# Patient Record
Sex: FEMALE | Race: WHITE | NOT HISPANIC OR LATINO | URBAN - METROPOLITAN AREA
[De-identification: names, ages, dates, MRNs, and addresses within clinical notes are randomized per-mention and may not be internally consistent; named-entity substitution may affect disease eponyms.]

---

## 2017-04-13 ENCOUNTER — EMERGENCY (EMERGENCY)
Facility: HOSPITAL | Age: 67
LOS: 1 days | Discharge: HOME | End: 2017-04-13
Attending: EMERGENCY MEDICINE | Admitting: EMERGENCY MEDICINE
Payer: COMMERCIAL

## 2017-04-13 VITALS
OXYGEN SATURATION: 94 % | TEMPERATURE: 98 F | SYSTOLIC BLOOD PRESSURE: 143 MMHG | RESPIRATION RATE: 20 BRPM | HEART RATE: 63 BPM | DIASTOLIC BLOOD PRESSURE: 91 MMHG

## 2017-04-13 VITALS
DIASTOLIC BLOOD PRESSURE: 60 MMHG | RESPIRATION RATE: 20 BRPM | OXYGEN SATURATION: 97 % | SYSTOLIC BLOOD PRESSURE: 119 MMHG | HEART RATE: 65 BPM

## 2017-04-13 DIAGNOSIS — I10 ESSENTIAL (PRIMARY) HYPERTENSION: ICD-10-CM

## 2017-04-13 DIAGNOSIS — Z96.653 PRESENCE OF ARTIFICIAL KNEE JOINT, BILATERAL: ICD-10-CM

## 2017-04-13 DIAGNOSIS — R13.0 APHAGIA: ICD-10-CM

## 2017-04-13 DIAGNOSIS — E78.5 HYPERLIPIDEMIA, UNSPECIFIED: ICD-10-CM

## 2017-04-13 DIAGNOSIS — R05 COUGH: ICD-10-CM

## 2017-04-13 DIAGNOSIS — J06.9 ACUTE UPPER RESPIRATORY INFECTION, UNSPECIFIED: ICD-10-CM

## 2017-04-13 DIAGNOSIS — Z96.659 PRESENCE OF UNSPECIFIED ARTIFICIAL KNEE JOINT: Chronic | ICD-10-CM

## 2017-04-13 DIAGNOSIS — Z79.82 LONG TERM (CURRENT) USE OF ASPIRIN: ICD-10-CM

## 2017-04-13 DIAGNOSIS — Z79.899 OTHER LONG TERM (CURRENT) DRUG THERAPY: ICD-10-CM

## 2017-04-13 DIAGNOSIS — Z88.5 ALLERGY STATUS TO NARCOTIC AGENT: ICD-10-CM

## 2017-04-13 LAB
ALBUMIN SERPL ELPH-MCNC: 4.3 G/DL — SIGNIFICANT CHANGE UP (ref 3.3–5)
ALP SERPL-CCNC: 110 U/L — SIGNIFICANT CHANGE UP (ref 40–120)
ALT FLD-CCNC: 33 U/L RC — SIGNIFICANT CHANGE UP (ref 10–45)
ANION GAP SERPL CALC-SCNC: 15 MMOL/L — SIGNIFICANT CHANGE UP (ref 5–17)
AST SERPL-CCNC: 27 U/L — SIGNIFICANT CHANGE UP (ref 10–40)
BASOPHILS # BLD AUTO: 0.1 K/UL — SIGNIFICANT CHANGE UP (ref 0–0.2)
BASOPHILS NFR BLD AUTO: 0.7 % — SIGNIFICANT CHANGE UP (ref 0–2)
BILIRUB SERPL-MCNC: 0.2 MG/DL — SIGNIFICANT CHANGE UP (ref 0.2–1.2)
BUN SERPL-MCNC: 18 MG/DL — SIGNIFICANT CHANGE UP (ref 7–23)
CALCIUM SERPL-MCNC: 9.7 MG/DL — SIGNIFICANT CHANGE UP (ref 8.4–10.5)
CHLORIDE SERPL-SCNC: 102 MMOL/L — SIGNIFICANT CHANGE UP (ref 96–108)
CO2 SERPL-SCNC: 27 MMOL/L — SIGNIFICANT CHANGE UP (ref 22–31)
CREAT SERPL-MCNC: 0.81 MG/DL — SIGNIFICANT CHANGE UP (ref 0.5–1.3)
EOSINOPHIL # BLD AUTO: 0.3 K/UL — SIGNIFICANT CHANGE UP (ref 0–0.5)
EOSINOPHIL NFR BLD AUTO: 3.3 % — SIGNIFICANT CHANGE UP (ref 0–6)
GAS PNL BLDV: SIGNIFICANT CHANGE UP
GLUCOSE SERPL-MCNC: 100 MG/DL — HIGH (ref 70–99)
HCT VFR BLD CALC: 41.6 % — SIGNIFICANT CHANGE UP (ref 34.5–45)
HGB BLD-MCNC: 13.7 G/DL — SIGNIFICANT CHANGE UP (ref 11.5–15.5)
LYMPHOCYTES # BLD AUTO: 1.7 K/UL — SIGNIFICANT CHANGE UP (ref 1–3.3)
LYMPHOCYTES # BLD AUTO: 22 % — SIGNIFICANT CHANGE UP (ref 13–44)
MCHC RBC-ENTMCNC: 28.5 PG — SIGNIFICANT CHANGE UP (ref 27–34)
MCHC RBC-ENTMCNC: 32.8 GM/DL — SIGNIFICANT CHANGE UP (ref 32–36)
MCV RBC AUTO: 86.6 FL — SIGNIFICANT CHANGE UP (ref 80–100)
MONOCYTES # BLD AUTO: 0.6 K/UL — SIGNIFICANT CHANGE UP (ref 0–0.9)
MONOCYTES NFR BLD AUTO: 8.3 % — SIGNIFICANT CHANGE UP (ref 2–14)
NEUTROPHILS # BLD AUTO: 5 K/UL — SIGNIFICANT CHANGE UP (ref 1.8–7.4)
NEUTROPHILS NFR BLD AUTO: 65.7 % — SIGNIFICANT CHANGE UP (ref 43–77)
PLATELET # BLD AUTO: 215 K/UL — SIGNIFICANT CHANGE UP (ref 150–400)
POTASSIUM SERPL-MCNC: 4.3 MMOL/L — SIGNIFICANT CHANGE UP (ref 3.5–5.3)
POTASSIUM SERPL-SCNC: 4.3 MMOL/L — SIGNIFICANT CHANGE UP (ref 3.5–5.3)
PROT SERPL-MCNC: 7.2 G/DL — SIGNIFICANT CHANGE UP (ref 6–8.3)
RAPID RVP RESULT: DETECTED
RBC # BLD: 4.81 M/UL — SIGNIFICANT CHANGE UP (ref 3.8–5.2)
RBC # FLD: 12.8 % — SIGNIFICANT CHANGE UP (ref 10.3–14.5)
RV+EV RNA SPEC QL NAA+PROBE: DETECTED
SODIUM SERPL-SCNC: 144 MMOL/L — SIGNIFICANT CHANGE UP (ref 135–145)
WBC # BLD: 7.6 K/UL — SIGNIFICANT CHANGE UP (ref 3.8–10.5)
WBC # FLD AUTO: 7.6 K/UL — SIGNIFICANT CHANGE UP (ref 3.8–10.5)

## 2017-04-13 PROCEDURE — 71046 X-RAY EXAM CHEST 2 VIEWS: CPT

## 2017-04-13 PROCEDURE — 83605 ASSAY OF LACTIC ACID: CPT

## 2017-04-13 PROCEDURE — 85014 HEMATOCRIT: CPT

## 2017-04-13 PROCEDURE — 99284 EMERGENCY DEPT VISIT MOD MDM: CPT

## 2017-04-13 PROCEDURE — 99283 EMERGENCY DEPT VISIT LOW MDM: CPT | Mod: 25

## 2017-04-13 PROCEDURE — 84132 ASSAY OF SERUM POTASSIUM: CPT

## 2017-04-13 PROCEDURE — 87633 RESP VIRUS 12-25 TARGETS: CPT

## 2017-04-13 PROCEDURE — 87581 M.PNEUMON DNA AMP PROBE: CPT

## 2017-04-13 PROCEDURE — 71020: CPT | Mod: 26

## 2017-04-13 PROCEDURE — 82947 ASSAY GLUCOSE BLOOD QUANT: CPT

## 2017-04-13 PROCEDURE — 87486 CHLMYD PNEUM DNA AMP PROBE: CPT

## 2017-04-13 PROCEDURE — 82435 ASSAY OF BLOOD CHLORIDE: CPT

## 2017-04-13 PROCEDURE — 94640 AIRWAY INHALATION TREATMENT: CPT

## 2017-04-13 PROCEDURE — 85027 COMPLETE CBC AUTOMATED: CPT

## 2017-04-13 PROCEDURE — 80053 COMPREHEN METABOLIC PANEL: CPT

## 2017-04-13 PROCEDURE — 82803 BLOOD GASES ANY COMBINATION: CPT

## 2017-04-13 PROCEDURE — 82330 ASSAY OF CALCIUM: CPT

## 2017-04-13 PROCEDURE — 84295 ASSAY OF SERUM SODIUM: CPT

## 2017-04-13 PROCEDURE — 87798 DETECT AGENT NOS DNA AMP: CPT

## 2017-04-13 RX ORDER — ALBUTEROL 90 UG/1
2 AEROSOL, METERED ORAL ONCE
Qty: 0 | Refills: 0 | Status: COMPLETED | OUTPATIENT
Start: 2017-04-13 | End: 2017-04-13

## 2017-04-13 RX ORDER — IPRATROPIUM/ALBUTEROL SULFATE 18-103MCG
3 AEROSOL WITH ADAPTER (GRAM) INHALATION ONCE
Qty: 0 | Refills: 0 | Status: COMPLETED | OUTPATIENT
Start: 2017-04-13 | End: 2017-04-13

## 2017-04-13 RX ADMIN — Medication 3 MILLILITER(S): at 15:26

## 2017-04-13 RX ADMIN — Medication 40 MILLIGRAM(S): at 18:12

## 2017-04-13 RX ADMIN — ALBUTEROL 2 PUFF(S): 90 AEROSOL, METERED ORAL at 18:13

## 2017-04-13 NOTE — ED ADULT NURSE NOTE - OBJECTIVE STATEMENT
Pt presents for eval of cough productive of green sputum, accompanied by green nasal discharge, wheezing and fever to 102 earlier thus week.  She was placed on a Z-lashay and given Phenergan syrup without improvement.   Initially on exam, n o wheezing heard, then did return.  Oral mucosa slightly dry, not cracked.

## 2017-04-13 NOTE — ED ADULT NURSE NOTE - PMH
Arthritis  hx bilateral knee replacements  HLD (hyperlipidemia)    HTN (hypertension)    Morbid obesity    Shingles outbreak  (2015)  UTI (urinary tract infection)  Nov 2013

## 2017-04-13 NOTE — ED PROVIDER NOTE - PLAN OF CARE
Taske prednisone 20 mg 2 tabs twice daily for 5 days.  Use the albuterol inhaler as needed.  Follow-up with your PCP in one week or if your symptoms worsen.

## 2017-04-13 NOTE — ED PROVIDER NOTE - OBJECTIVE STATEMENT
68 y/o woman with HTN, HLD presents complaining of cough.  5 days ago, patient had sore throat and difficulty swallowing.  The next day, she developed a cough and fever to 102.  3 days ago, patient presented to urgent care.  She was prescribed azithromycin, Promethazine DM syrup.  No CXR was performed.  Patient presents today because although fevers have resolved, chills and cough persist.  Cough is productive of yellow-green sputum.  Patient endorses many sick contacts at work, received flu shot September 2016. 68 y/o woman with HTN, HLD presents complaining of cough.  5 days ago, patient had sore throat and difficulty swallowing.  The next day, she developed a cough and fever to 102.  3 days ago, patient presented to urgent care.  She was prescribed azithromycin, Promethazine DM syrup.  No CXR was performed.  Patient presents today because although fevers have resolved, chills and cough persist.  Cough is productive of yellow-green sputum.  Patient endorses many sick contacts at work, received flu shot 2016.    Attendinyo female presents with cough and congestion for a few days.  Feels like she has a wheeze in her chest.  No fever/chills.

## 2017-04-13 NOTE — ED PROVIDER NOTE - PROGRESS NOTE DETAILS
Patient reports improved symptoms s/p nebulizer.  CXR neg, RVP pos for enterovirus.  Patient stable for discharge home

## 2017-04-13 NOTE — ED PROVIDER NOTE - CARE PLAN
Principal Discharge DX:	Upper respiratory infection  Instructions for follow-up, activity and diet:	Taske prednisone 20 mg 2 tabs twice daily for 5 days.  Use the albuterol inhaler as needed.  Follow-up with your PCP in one week or if your symptoms worsen.

## 2017-05-08 ENCOUNTER — OUTPATIENT (OUTPATIENT)
Dept: OUTPATIENT SERVICES | Facility: HOSPITAL | Age: 67
LOS: 1 days | End: 2017-05-08
Payer: COMMERCIAL

## 2017-05-08 ENCOUNTER — APPOINTMENT (OUTPATIENT)
Dept: CT IMAGING | Facility: IMAGING CENTER | Age: 67
End: 2017-05-08

## 2017-05-08 DIAGNOSIS — Z96.659 PRESENCE OF UNSPECIFIED ARTIFICIAL KNEE JOINT: Chronic | ICD-10-CM

## 2017-05-08 DIAGNOSIS — Z00.8 ENCOUNTER FOR OTHER GENERAL EXAMINATION: ICD-10-CM

## 2017-05-08 PROCEDURE — 71250 CT THORAX DX C-: CPT

## 2017-06-07 ENCOUNTER — EMERGENCY (EMERGENCY)
Facility: HOSPITAL | Age: 67
LOS: 1 days | Discharge: HOME | End: 2017-06-07
Attending: EMERGENCY MEDICINE | Admitting: EMERGENCY MEDICINE
Payer: COMMERCIAL

## 2017-06-07 VITALS
TEMPERATURE: 98 F | SYSTOLIC BLOOD PRESSURE: 172 MMHG | RESPIRATION RATE: 16 BRPM | HEART RATE: 61 BPM | DIASTOLIC BLOOD PRESSURE: 94 MMHG | OXYGEN SATURATION: 96 %

## 2017-06-07 VITALS
SYSTOLIC BLOOD PRESSURE: 146 MMHG | RESPIRATION RATE: 16 BRPM | DIASTOLIC BLOOD PRESSURE: 91 MMHG | OXYGEN SATURATION: 97 % | HEART RATE: 62 BPM

## 2017-06-07 DIAGNOSIS — Z79.899 OTHER LONG TERM (CURRENT) DRUG THERAPY: ICD-10-CM

## 2017-06-07 DIAGNOSIS — R35.0 FREQUENCY OF MICTURITION: ICD-10-CM

## 2017-06-07 DIAGNOSIS — Z96.659 PRESENCE OF UNSPECIFIED ARTIFICIAL KNEE JOINT: Chronic | ICD-10-CM

## 2017-06-07 DIAGNOSIS — R30.0 DYSURIA: ICD-10-CM

## 2017-06-07 DIAGNOSIS — I10 ESSENTIAL (PRIMARY) HYPERTENSION: ICD-10-CM

## 2017-06-07 DIAGNOSIS — R10.32 LEFT LOWER QUADRANT PAIN: ICD-10-CM

## 2017-06-07 DIAGNOSIS — K57.32 DIVERTICULITIS OF LARGE INTESTINE WITHOUT PERFORATION OR ABSCESS WITHOUT BLEEDING: ICD-10-CM

## 2017-06-07 LAB
ALBUMIN SERPL ELPH-MCNC: 3.9 G/DL — SIGNIFICANT CHANGE UP (ref 3.3–5)
ALP SERPL-CCNC: 100 U/L — SIGNIFICANT CHANGE UP (ref 40–120)
ALT FLD-CCNC: 27 U/L RC — SIGNIFICANT CHANGE UP (ref 10–45)
ANION GAP SERPL CALC-SCNC: 13 MMOL/L — SIGNIFICANT CHANGE UP (ref 5–17)
APPEARANCE UR: CLEAR — SIGNIFICANT CHANGE UP
AST SERPL-CCNC: 26 U/L — SIGNIFICANT CHANGE UP (ref 10–40)
BASOPHILS # BLD AUTO: 0 K/UL — SIGNIFICANT CHANGE UP (ref 0–0.2)
BASOPHILS NFR BLD AUTO: 0.6 % — SIGNIFICANT CHANGE UP (ref 0–2)
BILIRUB SERPL-MCNC: 0.3 MG/DL — SIGNIFICANT CHANGE UP (ref 0.2–1.2)
BILIRUB UR-MCNC: NEGATIVE — SIGNIFICANT CHANGE UP
BUN SERPL-MCNC: 20 MG/DL — SIGNIFICANT CHANGE UP (ref 7–23)
CALCIUM SERPL-MCNC: 9.8 MG/DL — SIGNIFICANT CHANGE UP (ref 8.4–10.5)
CHLORIDE SERPL-SCNC: 104 MMOL/L — SIGNIFICANT CHANGE UP (ref 96–108)
CO2 SERPL-SCNC: 26 MMOL/L — SIGNIFICANT CHANGE UP (ref 22–31)
COLOR SPEC: YELLOW — SIGNIFICANT CHANGE UP
CREAT SERPL-MCNC: 0.61 MG/DL — SIGNIFICANT CHANGE UP (ref 0.5–1.3)
DIFF PNL FLD: NEGATIVE — SIGNIFICANT CHANGE UP
EOSINOPHIL # BLD AUTO: 0.3 K/UL — SIGNIFICANT CHANGE UP (ref 0–0.5)
EOSINOPHIL NFR BLD AUTO: 4.9 % — SIGNIFICANT CHANGE UP (ref 0–6)
EPI CELLS # UR: SIGNIFICANT CHANGE UP /HPF
GLUCOSE SERPL-MCNC: 103 MG/DL — HIGH (ref 70–99)
GLUCOSE UR QL: NEGATIVE — SIGNIFICANT CHANGE UP
HCT VFR BLD CALC: 44.6 % — SIGNIFICANT CHANGE UP (ref 34.5–45)
HGB BLD-MCNC: 13.5 G/DL — SIGNIFICANT CHANGE UP (ref 11.5–15.5)
KETONES UR-MCNC: NEGATIVE — SIGNIFICANT CHANGE UP
LEUKOCYTE ESTERASE UR-ACNC: ABNORMAL
LYMPHOCYTES # BLD AUTO: 1.6 K/UL — SIGNIFICANT CHANGE UP (ref 1–3.3)
LYMPHOCYTES # BLD AUTO: 22.5 % — SIGNIFICANT CHANGE UP (ref 13–44)
MCHC RBC-ENTMCNC: 26.9 PG — LOW (ref 27–34)
MCHC RBC-ENTMCNC: 30.4 GM/DL — LOW (ref 32–36)
MCV RBC AUTO: 88.4 FL — SIGNIFICANT CHANGE UP (ref 80–100)
MONOCYTES # BLD AUTO: 0.6 K/UL — SIGNIFICANT CHANGE UP (ref 0–0.9)
MONOCYTES NFR BLD AUTO: 8.9 % — SIGNIFICANT CHANGE UP (ref 2–14)
NEUTROPHILS # BLD AUTO: 4.4 K/UL — SIGNIFICANT CHANGE UP (ref 1.8–7.4)
NEUTROPHILS NFR BLD AUTO: 63 % — SIGNIFICANT CHANGE UP (ref 43–77)
NITRITE UR-MCNC: POSITIVE
PH UR: 5.5 — SIGNIFICANT CHANGE UP (ref 5–8)
PLATELET # BLD AUTO: 220 K/UL — SIGNIFICANT CHANGE UP (ref 150–400)
POTASSIUM SERPL-MCNC: 4.5 MMOL/L — SIGNIFICANT CHANGE UP (ref 3.5–5.3)
POTASSIUM SERPL-SCNC: 4.5 MMOL/L — SIGNIFICANT CHANGE UP (ref 3.5–5.3)
PROT SERPL-MCNC: 7.1 G/DL — SIGNIFICANT CHANGE UP (ref 6–8.3)
PROT UR-MCNC: NEGATIVE — SIGNIFICANT CHANGE UP
RBC # BLD: 5.04 M/UL — SIGNIFICANT CHANGE UP (ref 3.8–5.2)
RBC # FLD: 12.4 % — SIGNIFICANT CHANGE UP (ref 10.3–14.5)
RBC CASTS # UR COMP ASSIST: SIGNIFICANT CHANGE UP /HPF (ref 0–2)
SODIUM SERPL-SCNC: 143 MMOL/L — SIGNIFICANT CHANGE UP (ref 135–145)
SP GR SPEC: 1.02 — SIGNIFICANT CHANGE UP (ref 1.01–1.02)
UROBILINOGEN FLD QL: NEGATIVE — SIGNIFICANT CHANGE UP
WBC # BLD: 7 K/UL — SIGNIFICANT CHANGE UP (ref 3.8–10.5)
WBC # FLD AUTO: 7 K/UL — SIGNIFICANT CHANGE UP (ref 3.8–10.5)
WBC UR QL: SIGNIFICANT CHANGE UP /HPF (ref 0–5)

## 2017-06-07 PROCEDURE — 87086 URINE CULTURE/COLONY COUNT: CPT

## 2017-06-07 PROCEDURE — 99284 EMERGENCY DEPT VISIT MOD MDM: CPT | Mod: 25

## 2017-06-07 PROCEDURE — 80053 COMPREHEN METABOLIC PANEL: CPT

## 2017-06-07 PROCEDURE — 81001 URINALYSIS AUTO W/SCOPE: CPT

## 2017-06-07 PROCEDURE — 99283 EMERGENCY DEPT VISIT LOW MDM: CPT

## 2017-06-07 PROCEDURE — 85027 COMPLETE CBC AUTOMATED: CPT

## 2017-06-07 PROCEDURE — 87186 SC STD MICRODIL/AGAR DIL: CPT

## 2017-06-07 RX ORDER — METRONIDAZOLE 500 MG
500 TABLET ORAL ONCE
Qty: 0 | Refills: 0 | Status: COMPLETED | OUTPATIENT
Start: 2017-06-07 | End: 2017-06-07

## 2017-06-07 RX ORDER — CIPROFLOXACIN LACTATE 400MG/40ML
1 VIAL (ML) INTRAVENOUS
Qty: 20 | Refills: 0 | OUTPATIENT
Start: 2017-06-07 | End: 2017-06-17

## 2017-06-07 RX ORDER — METRONIDAZOLE 500 MG
1 TABLET ORAL
Qty: 42 | Refills: 0 | OUTPATIENT
Start: 2017-06-07 | End: 2017-06-21

## 2017-06-07 RX ORDER — CEPHALEXIN 500 MG
1 CAPSULE ORAL
Qty: 21 | Refills: 0 | OUTPATIENT
Start: 2017-06-07 | End: 2017-06-14

## 2017-06-07 RX ORDER — CEFTRIAXONE 500 MG/1
1 INJECTION, POWDER, FOR SOLUTION INTRAMUSCULAR; INTRAVENOUS ONCE
Qty: 0 | Refills: 0 | Status: DISCONTINUED | OUTPATIENT
Start: 2017-06-07 | End: 2017-06-07

## 2017-06-07 RX ORDER — CIPROFLOXACIN LACTATE 400MG/40ML
500 VIAL (ML) INTRAVENOUS ONCE
Qty: 0 | Refills: 0 | Status: COMPLETED | OUTPATIENT
Start: 2017-06-07 | End: 2017-06-07

## 2017-06-07 RX ORDER — PHENAZOPYRIDINE HCL 100 MG
1 TABLET ORAL
Qty: 6 | Refills: 0 | OUTPATIENT
Start: 2017-06-07 | End: 2017-06-09

## 2017-06-07 RX ADMIN — Medication 500 MILLIGRAM(S): at 13:29

## 2017-06-07 NOTE — ED PROVIDER NOTE - OBJECTIVE STATEMENT
67 y.o. female history of HTN and hyperlipidemia, coming in with episodic lower abdominal pain over the past 3 days.  + dysuria and frequency.  No discharge from vagina.  No back pain or fever.  No nausea, vomiting, or diarrhea.  + pruritis all over body, no skin changes.  Pain comes and goes on its own but can be reproduced by palpation. 67 y.o. female history of HTN and hyperlipidemia, coming in with episodic lower abdominal pain over the past 3 days.  + dysuria and frequency.  No discharge from vagina.  No back pain or fever.  No nausea, vomiting, or diarrhea.  + pruritis all over body, no skin changes.  Pain comes and goes on its own but can be reproduced by palpation.    Shipman:  LLQ pain for 4 days.  No fever no N/V.

## 2017-06-07 NOTE — ED PROVIDER NOTE - ATTENDING CONTRIBUTION TO CARE
Umberto:  I have independently evaluated the patient and have documented in the appropriate sections above.  I agree with the exam and plan as noted above.

## 2017-06-07 NOTE — ED PROVIDER NOTE - PROGRESS NOTE DETAILS
Pt has a nitrite positive UTI, will cancel CT as suspicion for diverticulitis is no longer as high  in the context of the UA findings.  BS Umberto:  Seen by me.  will treat for presumptive diverticulitis

## 2017-06-07 NOTE — ED PROVIDER NOTE - PHYSICAL EXAMINATION
Shipman:  General: No distress.  Mentation at baseline.   HEENT: WNL  Chest/Lungs: CTAB, No wheeze, No retractions, No increased work of breathing, Normal rate  Heart: S1S2 RRR, No M/R/G, Pules equal Bilaterally in upper and lower extremities distally  Abd: soft, tender to left side, No guarding, No rebound.  No hernias, no palpable masses.  Extrem: FROM in all joints, no significant edema noted, No ulcers.  Cap refil < 2sec.  Skin: No rash noted, warm dry.  Neuro:  Grossly normal.  No difficulty ambulating. No focal deficits.  Psychiatric: No evidence of delusions. No SI/HI.

## 2017-06-07 NOTE — ED ADULT NURSE NOTE - OBJECTIVE STATEMENT
pt states 3 days hx of abd pain bloating presents to er pt denies fevers or chills no vomiting pt staes no appetite increased urination verbalized last colonoscopy 10 years ago normal bowel movement today

## 2017-06-07 NOTE — ED PROVIDER NOTE - PRINCIPAL DIAGNOSIS
Acute cystitis without hematuria Diverticulitis of large intestine without perforation or abscess without bleeding

## 2017-06-07 NOTE — ED PROVIDER NOTE - CARE PLAN
Principal Discharge DX:	Acute cystitis without hematuria Principal Discharge DX:	Diverticulitis of large intestine without perforation or abscess without bleeding

## 2017-06-11 RX ORDER — NITROFURANTOIN MACROCRYSTAL 50 MG
1 CAPSULE ORAL
Qty: 14 | Refills: 0 | OUTPATIENT
Start: 2017-06-11 | End: 2017-06-18

## 2017-06-11 NOTE — ED POST DISCHARGE NOTE - DETAILS
6/11/17, 3:45pm: Left voicemail for pt to call back. Pt should be started on Macrobid based on UC sensitivities. - Kenton Dumont PA-C

## 2017-06-11 NOTE — ED POST DISCHARGE NOTE - OTHER COMMUNICATION
6/11/17, 4:30pm: Pt called back and was informed about need for Macrobid based on UC sensitivity. Rx sent to pharmacy, and pt was instructed to return to ER if she experiences worsening symptoms or signs of infection such as high fever. Pt reports follow up with GI who has scheduled colonoscopy to investigate diverticulitis dx. - Kenton Dumont PA-C

## 2017-07-19 ENCOUNTER — RESULT REVIEW (OUTPATIENT)
Age: 67
End: 2017-07-19

## 2017-08-22 ENCOUNTER — APPOINTMENT (OUTPATIENT)
Dept: MAMMOGRAPHY | Facility: IMAGING CENTER | Age: 67
End: 2017-08-22
Payer: COMMERCIAL

## 2017-08-22 ENCOUNTER — OUTPATIENT (OUTPATIENT)
Dept: OUTPATIENT SERVICES | Facility: HOSPITAL | Age: 67
LOS: 1 days | End: 2017-08-22
Payer: COMMERCIAL

## 2017-08-22 DIAGNOSIS — Z00.8 ENCOUNTER FOR OTHER GENERAL EXAMINATION: ICD-10-CM

## 2017-08-22 DIAGNOSIS — Z96.659 PRESENCE OF UNSPECIFIED ARTIFICIAL KNEE JOINT: Chronic | ICD-10-CM

## 2017-08-22 PROCEDURE — 77063 BREAST TOMOSYNTHESIS BI: CPT

## 2017-08-22 PROCEDURE — 77067 SCR MAMMO BI INCL CAD: CPT

## 2017-08-22 PROCEDURE — 77063 BREAST TOMOSYNTHESIS BI: CPT | Mod: 26

## 2017-08-22 PROCEDURE — G0202: CPT | Mod: 26

## 2017-12-18 ENCOUNTER — APPOINTMENT (OUTPATIENT)
Dept: ORTHOPEDIC SURGERY | Facility: CLINIC | Age: 67
End: 2017-12-18

## 2018-06-24 ENCOUNTER — EMERGENCY (EMERGENCY)
Facility: HOSPITAL | Age: 68
LOS: 1 days | Discharge: HOME | End: 2018-06-24
Attending: EMERGENCY MEDICINE | Admitting: EMERGENCY MEDICINE
Payer: COMMERCIAL

## 2018-06-24 VITALS
OXYGEN SATURATION: 100 % | DIASTOLIC BLOOD PRESSURE: 87 MMHG | TEMPERATURE: 99 F | HEART RATE: 65 BPM | RESPIRATION RATE: 98 BRPM | SYSTOLIC BLOOD PRESSURE: 170 MMHG

## 2018-06-24 DIAGNOSIS — Z96.659 PRESENCE OF UNSPECIFIED ARTIFICIAL KNEE JOINT: Chronic | ICD-10-CM

## 2018-06-24 LAB
APPEARANCE UR: CLEAR — SIGNIFICANT CHANGE UP
BACTERIA # UR AUTO: SIGNIFICANT CHANGE UP
BILIRUB UR-MCNC: NEGATIVE — SIGNIFICANT CHANGE UP
BLOOD UR QL VISUAL: NEGATIVE — SIGNIFICANT CHANGE UP
COLOR SPEC: YELLOW — SIGNIFICANT CHANGE UP
GLUCOSE UR-MCNC: NEGATIVE — SIGNIFICANT CHANGE UP
KETONES UR-MCNC: NEGATIVE — SIGNIFICANT CHANGE UP
LEUKOCYTE ESTERASE UR-ACNC: HIGH
MUCOUS THREADS # UR AUTO: SIGNIFICANT CHANGE UP
NITRITE UR-MCNC: NEGATIVE — SIGNIFICANT CHANGE UP
NON-SQ EPI CELLS # UR AUTO: <1 — SIGNIFICANT CHANGE UP
PH UR: 6 — SIGNIFICANT CHANGE UP (ref 4.6–8)
PROT UR-MCNC: 10 MG/DL — SIGNIFICANT CHANGE UP
RBC CASTS # UR COMP ASSIST: SIGNIFICANT CHANGE UP (ref 0–?)
SP GR SPEC: 1.02 — SIGNIFICANT CHANGE UP (ref 1–1.04)
SQUAMOUS # UR AUTO: SIGNIFICANT CHANGE UP
UROBILINOGEN FLD QL: NORMAL MG/DL — SIGNIFICANT CHANGE UP
WBC UR QL: SIGNIFICANT CHANGE UP (ref 0–?)

## 2018-06-24 PROCEDURE — 99283 EMERGENCY DEPT VISIT LOW MDM: CPT

## 2018-06-24 RX ORDER — FAMOTIDINE 10 MG/ML
20 INJECTION INTRAVENOUS ONCE
Qty: 0 | Refills: 0 | Status: COMPLETED | OUTPATIENT
Start: 2018-06-24 | End: 2018-06-24

## 2018-06-24 RX ORDER — DIPHENHYDRAMINE HCL 50 MG
1 CAPSULE ORAL
Qty: 16 | Refills: 0 | OUTPATIENT
Start: 2018-06-24 | End: 2018-06-27

## 2018-06-24 RX ORDER — FAMOTIDINE 10 MG/ML
1 INJECTION INTRAVENOUS
Qty: 14 | Refills: 0 | OUTPATIENT
Start: 2018-06-24 | End: 2018-06-30

## 2018-06-24 RX ORDER — LORATADINE 10 MG/1
10 TABLET ORAL ONCE
Qty: 0 | Refills: 0 | Status: COMPLETED | OUTPATIENT
Start: 2018-06-24 | End: 2018-06-24

## 2018-06-24 RX ADMIN — FAMOTIDINE 20 MILLIGRAM(S): 10 INJECTION INTRAVENOUS at 09:25

## 2018-06-24 RX ADMIN — LORATADINE 10 MILLIGRAM(S): 10 TABLET ORAL at 09:26

## 2018-06-24 NOTE — ED PROVIDER NOTE - MEDICAL DECISION MAKING DETAILS
Cabot: 68F coming in with 4 days of pruritic maculopapular rash throughout her face, torso, and extremities.  Worse at night in bed.  Notes that she changed her detergent the day before the rash started and washed her sheets with it.  Also had recently taken 30 days of bactrim for chronic UTI but stopped this 6 days ago.  No throat swelling or difficulty swallowing or breathing.  On exam, HDS, NAD, lungs CTAB, heart sounds normal, abd benign, LEs without edema, skin with diffuse maculopapular rash throughout, NTTP, not warm, no discharge.  Likely allergy to new detergent.  Will give claritin and H2B and recommend switching detergent today. Cabot: 68F coming in with 4 days of pruritic maculopapular rash throughout her face, torso, and extremities.  Worse at night in bed.  Notes that she changed her detergent the day before the rash started and washed her sheets with it.  Also had recently taken 30 days of bactrim for chronic UTI but stopped this 6 days ago.  No throat swelling or difficulty swallowing or breathing.  On exam, HDS, NAD, lungs CTAB, heart sounds normal, abd benign, LEs without edema, skin with diffuse maculopapular rash throughout, NTTP, not warm, no discharge.  Likely allergy to new detergent.  Will give claritin and H2B and recommend switching detergent today.  Will recommend benadryl for home.

## 2018-06-24 NOTE — ED PROVIDER NOTE - ATTENDING CONTRIBUTION TO CARE
I, Jennifer Cabot, MD, have performed a history and physical exam of the patient and discussed their management with the resident. I reviewed the resident's note and agree with the documented findings and plan of care. My medical decision making and observations are found above.    Cabot: 68F coming in with 4 days of pruritic maculopapular rash throughout her face, torso, and extremities.  Worse at night in bed.  Notes that she changed her detergent the day before the rash started and washed her sheets with it.  Also had recently taken 30 days of bactrim for chronic UTI but stopped this 6 days ago.  No throat swelling or difficulty swallowing or breathing.  On exam, HDS, NAD, lungs CTAB, heart sounds normal, abd benign, LEs without edema, skin with diffuse maculopapular rash throughout, NTTP, not warm, no discharge.  Likely allergy to new detergent.  Will give claritin and H2B and recommend switching detergent today.

## 2018-06-24 NOTE — ED ADULT NURSE REASSESSMENT NOTE - NS ED NURSE REASSESS COMMENT FT1
pt alert,oriented x3. reports rash to body starting thurs pm. denies diff breathing. med as ordered.

## 2018-06-24 NOTE — ED PROVIDER NOTE - OBJECTIVE STATEMENT
69 y/o F with a PMHx of HLD, HTN,UTI, Arthritis, Shingles and Morbid obesity  presents to the ED complaining of itching, rash and burning to her entire body for the past 3 days and is worse at night when she goes to sleep. Patient is currently managed on Bactrim for UTI for the past month. Patient endorses the use of a new detergent. Denies fevers/chills , nausea/vomiting , difficulty breathing, throat closing or any other related symptoms.

## 2018-06-24 NOTE — ED PROVIDER NOTE - PMH
Arthritis  hx bilateral knee replacements  HLD (hyperlipidemia)    HTN (hypertension)    HTN (hypertension)    Morbid obesity    Shingles outbreak  (2015)  UTI (urinary tract infection)  Nov 2013

## 2018-06-24 NOTE — ED ADULT TRIAGE NOTE - CHIEF COMPLAINT QUOTE
Pt c/o itching/rash/burning sensation to entire body x 3 days. Denies bite marks/new products. Pt states started new antibiotic for UTI 20 days ago.

## 2018-06-25 LAB
BACTERIA UR CULT: SIGNIFICANT CHANGE UP
SPECIMEN SOURCE: SIGNIFICANT CHANGE UP

## 2018-06-27 ENCOUNTER — RESULT REVIEW (OUTPATIENT)
Age: 68
End: 2018-06-27

## 2018-07-18 PROBLEM — N30.20 CHRONIC CYSTITIS: Status: ACTIVE | Noted: 2018-07-18

## 2018-07-18 PROBLEM — Z80.3 FAMILY HISTORY OF MALIGNANT NEOPLASM OF BREAST: Status: ACTIVE | Noted: 2018-07-18

## 2018-07-18 PROBLEM — Z87.19 HISTORY OF DIVERTICULITIS OF COLON: Status: RESOLVED | Noted: 2018-07-18 | Resolved: 2018-07-18

## 2018-07-24 ENCOUNTER — APPOINTMENT (OUTPATIENT)
Dept: GYNECOLOGIC ONCOLOGY | Facility: CLINIC | Age: 68
End: 2018-07-24
Payer: COMMERCIAL

## 2018-07-24 VITALS — SYSTOLIC BLOOD PRESSURE: 142 MMHG | DIASTOLIC BLOOD PRESSURE: 90 MMHG

## 2018-07-24 VITALS
BODY MASS INDEX: 38.98 KG/M2 | SYSTOLIC BLOOD PRESSURE: 172 MMHG | DIASTOLIC BLOOD PRESSURE: 98 MMHG | HEIGHT: 63 IN | WEIGHT: 220 LBS

## 2018-07-24 DIAGNOSIS — Z96.651 PRESENCE OF RIGHT ARTIFICIAL KNEE JOINT: ICD-10-CM

## 2018-07-24 DIAGNOSIS — Z87.39 PERSONAL HISTORY OF OTHER DISEASES OF THE MUSCULOSKELETAL SYSTEM AND CONNECTIVE TISSUE: ICD-10-CM

## 2018-07-24 DIAGNOSIS — S33.5XXA SPRAIN OF LIGAMENTS OF LUMBAR SPINE, INITIAL ENCOUNTER: ICD-10-CM

## 2018-07-24 DIAGNOSIS — B02.29 OTHER POSTHERPETIC NERVOUS SYSTEM INVOLVEMENT: ICD-10-CM

## 2018-07-24 DIAGNOSIS — Z87.19 PERSONAL HISTORY OF OTHER DISEASES OF THE DIGESTIVE SYSTEM: ICD-10-CM

## 2018-07-24 DIAGNOSIS — Z80.6 FAMILY HISTORY OF LEUKEMIA: ICD-10-CM

## 2018-07-24 DIAGNOSIS — F32.9 ANXIETY DISORDER, UNSPECIFIED: ICD-10-CM

## 2018-07-24 DIAGNOSIS — M50.30 OTHER CERVICAL DISC DEGENERATION, UNSPECIFIED CERVICAL REGION: ICD-10-CM

## 2018-07-24 DIAGNOSIS — F41.9 ANXIETY DISORDER, UNSPECIFIED: ICD-10-CM

## 2018-07-24 DIAGNOSIS — M25.561 PAIN IN RIGHT KNEE: ICD-10-CM

## 2018-07-24 DIAGNOSIS — M79.603 PAIN IN ARM, UNSPECIFIED: ICD-10-CM

## 2018-07-24 DIAGNOSIS — Z86.19 PERSONAL HISTORY OF OTHER INFECTIOUS AND PARASITIC DISEASES: ICD-10-CM

## 2018-07-24 DIAGNOSIS — N30.20 OTHER CHRONIC CYSTITIS W/OUT HEMATURIA: ICD-10-CM

## 2018-07-24 DIAGNOSIS — Z63.4 DISAPPEARANCE AND DEATH OF FAMILY MEMBER: ICD-10-CM

## 2018-07-24 DIAGNOSIS — Z96.652 PRESENCE OF LEFT ARTIFICIAL KNEE JOINT: ICD-10-CM

## 2018-07-24 DIAGNOSIS — M47.816 SPONDYLOSIS W/OUT MYELOPATHY OR RADICULOPATHY, LUMBAR REGION: ICD-10-CM

## 2018-07-24 DIAGNOSIS — Z86.69 PERSONAL HISTORY OF OTHER DISEASES OF THE NERVOUS SYSTEM AND SENSE ORGANS: ICD-10-CM

## 2018-07-24 DIAGNOSIS — Z80.3 FAMILY HISTORY OF MALIGNANT NEOPLASM OF BREAST: ICD-10-CM

## 2018-07-24 PROCEDURE — 99244 OFF/OP CNSLTJ NEW/EST MOD 40: CPT

## 2018-07-24 SDOH — SOCIAL STABILITY - SOCIAL INSECURITY: DISSAPEARANCE AND DEATH OF FAMILY MEMBER: Z63.4

## 2018-07-25 ENCOUNTER — OTHER (OUTPATIENT)
Age: 68
End: 2018-07-25

## 2018-07-25 PROBLEM — I10 ESSENTIAL (PRIMARY) HYPERTENSION: Chronic | Status: ACTIVE | Noted: 2017-06-07

## 2018-07-31 ENCOUNTER — OTHER (OUTPATIENT)
Age: 68
End: 2018-07-31

## 2018-08-15 ENCOUNTER — OUTPATIENT (OUTPATIENT)
Dept: OUTPATIENT SERVICES | Facility: HOSPITAL | Age: 68
LOS: 1 days | End: 2018-08-15
Payer: COMMERCIAL

## 2018-08-15 VITALS
RESPIRATION RATE: 16 BRPM | HEART RATE: 58 BPM | HEIGHT: 63 IN | SYSTOLIC BLOOD PRESSURE: 140 MMHG | TEMPERATURE: 98 F | DIASTOLIC BLOOD PRESSURE: 80 MMHG | WEIGHT: 220.02 LBS

## 2018-08-15 DIAGNOSIS — N85.02 ENDOMETRIAL INTRAEPITHELIAL NEOPLASIA [EIN]: ICD-10-CM

## 2018-08-15 DIAGNOSIS — I10 ESSENTIAL (PRIMARY) HYPERTENSION: ICD-10-CM

## 2018-08-15 DIAGNOSIS — Z96.659 PRESENCE OF UNSPECIFIED ARTIFICIAL KNEE JOINT: Chronic | ICD-10-CM

## 2018-08-15 DIAGNOSIS — C54.1 MALIGNANT NEOPLASM OF ENDOMETRIUM: ICD-10-CM

## 2018-08-15 DIAGNOSIS — Z29.9 ENCOUNTER FOR PROPHYLACTIC MEASURES, UNSPECIFIED: ICD-10-CM

## 2018-08-15 LAB
APPEARANCE UR: CLEAR — SIGNIFICANT CHANGE UP
BACTERIA # UR AUTO: HIGH
BILIRUB UR-MCNC: NEGATIVE — SIGNIFICANT CHANGE UP
BLD GP AB SCN SERPL QL: NEGATIVE — SIGNIFICANT CHANGE UP
BLOOD UR QL VISUAL: NEGATIVE — SIGNIFICANT CHANGE UP
BUN SERPL-MCNC: 26 MG/DL — HIGH (ref 7–23)
CALCIUM SERPL-MCNC: 10 MG/DL — SIGNIFICANT CHANGE UP (ref 8.4–10.5)
CHLORIDE SERPL-SCNC: 102 MMOL/L — SIGNIFICANT CHANGE UP (ref 98–107)
CO2 SERPL-SCNC: 29 MMOL/L — SIGNIFICANT CHANGE UP (ref 22–31)
COLOR SPEC: YELLOW — SIGNIFICANT CHANGE UP
CREAT SERPL-MCNC: 0.71 MG/DL — SIGNIFICANT CHANGE UP (ref 0.5–1.3)
GLUCOSE SERPL-MCNC: 88 MG/DL — SIGNIFICANT CHANGE UP (ref 70–99)
GLUCOSE UR-MCNC: NEGATIVE — SIGNIFICANT CHANGE UP
HBA1C BLD-MCNC: 5.7 % — HIGH (ref 4–5.6)
HCT VFR BLD CALC: 44.2 % — SIGNIFICANT CHANGE UP (ref 34.5–45)
HGB BLD-MCNC: 13.9 G/DL — SIGNIFICANT CHANGE UP (ref 11.5–15.5)
KETONES UR-MCNC: NEGATIVE — SIGNIFICANT CHANGE UP
LEUKOCYTE ESTERASE UR-ACNC: SIGNIFICANT CHANGE UP
MCHC RBC-ENTMCNC: 27.7 PG — SIGNIFICANT CHANGE UP (ref 27–34)
MCHC RBC-ENTMCNC: 31.4 % — LOW (ref 32–36)
MCV RBC AUTO: 88.2 FL — SIGNIFICANT CHANGE UP (ref 80–100)
NITRITE UR-MCNC: POSITIVE — HIGH
NRBC # FLD: 0 — SIGNIFICANT CHANGE UP
PH UR: 6 — SIGNIFICANT CHANGE UP (ref 5–8)
PLATELET # BLD AUTO: 234 K/UL — SIGNIFICANT CHANGE UP (ref 150–400)
PMV BLD: 12.1 FL — SIGNIFICANT CHANGE UP (ref 7–13)
POTASSIUM SERPL-MCNC: 4.2 MMOL/L — SIGNIFICANT CHANGE UP (ref 3.5–5.3)
POTASSIUM SERPL-SCNC: 4.2 MMOL/L — SIGNIFICANT CHANGE UP (ref 3.5–5.3)
PROT UR-MCNC: SIGNIFICANT CHANGE UP
RBC # BLD: 5.01 M/UL — SIGNIFICANT CHANGE UP (ref 3.8–5.2)
RBC # FLD: 13.5 % — SIGNIFICANT CHANGE UP (ref 10.3–14.5)
RBC CASTS # UR COMP ASSIST: SIGNIFICANT CHANGE UP
RH IG SCN BLD-IMP: POSITIVE — SIGNIFICANT CHANGE UP
SODIUM SERPL-SCNC: 145 MMOL/L — SIGNIFICANT CHANGE UP (ref 135–145)
SP GR SPEC: 1.03 — SIGNIFICANT CHANGE UP (ref 1–1.04)
SQUAMOUS # UR AUTO: SIGNIFICANT CHANGE UP
UROBILINOGEN FLD QL: NORMAL — SIGNIFICANT CHANGE UP
WBC # BLD: 8.8 K/UL — SIGNIFICANT CHANGE UP (ref 3.8–10.5)
WBC # FLD AUTO: 8.8 K/UL — SIGNIFICANT CHANGE UP (ref 3.8–10.5)
WBC CASTS UR QL COMP ASSIST: NEGATIVE — SIGNIFICANT CHANGE UP
WBC UR QL: SIGNIFICANT CHANGE UP

## 2018-08-15 PROCEDURE — 93010 ELECTROCARDIOGRAM REPORT: CPT

## 2018-08-15 RX ORDER — AZITHROMYCIN 500 MG/1
0 TABLET, FILM COATED ORAL
Qty: 0 | Refills: 0 | COMMUNITY

## 2018-08-15 RX ORDER — HYDROCHLOROTHIAZIDE 25 MG
0 TABLET ORAL
Qty: 0 | Refills: 0 | COMMUNITY

## 2018-08-15 RX ORDER — LUTEIN 20 MG
1 CAPSULE ORAL
Qty: 0 | Refills: 0 | COMMUNITY

## 2018-08-15 RX ORDER — NEBIVOLOL HYDROCHLORIDE 5 MG/1
0 TABLET ORAL
Qty: 0 | Refills: 0 | COMMUNITY

## 2018-08-15 RX ORDER — AMLODIPINE BESYLATE 2.5 MG/1
0 TABLET ORAL
Qty: 0 | Refills: 0 | COMMUNITY

## 2018-08-15 NOTE — H&P PST ADULT - NEGATIVE ENMT SYMPTOMS
no gum bleeding/no throat pain/no tinnitus/no nose bleeds/no hearing difficulty/no vertigo/no sinus symptoms/no ear pain

## 2018-08-15 NOTE — H&P PST ADULT - MALLAMPATI CLASS
unable to view uvula/Class IV (difficult) - the soft palate is not visible at all Class IV (difficult) - the soft palate is not visible at all

## 2018-08-15 NOTE — H&P PST ADULT - NSANTHOSAYNRD_GEN_A_CORE
No. VIOLETA screening performed.  STOP BANG Legend: 0-2 = LOW Risk; 3-4 = INTERMEDIATE Risk; 5-8 = HIGH Risk

## 2018-08-15 NOTE — H&P PST ADULT - NEGATIVE FEMALE-SPECIFIC SYMPTOMS
no abnormal vaginal bleeding/no pelvic pain/no vaginal discharge no abnormal vaginal bleeding/no pelvic pain/no vaginal discharge/no spotting

## 2018-08-15 NOTE — H&P PST ADULT - ASSESSMENT
69 y/o female scheduled for robotic total abdominal hysterectomy, bilateral salpingo oophorectomy, sentinel lymph node mapping, possible staging on 8/23/18

## 2018-08-15 NOTE — H&P PST ADULT - PMH
Arthritis  hx bilateral knee replacements  Dysphagia    Endometrial cancer    HLD (hyperlipidemia)    HTN (hypertension)    HTN (hypertension)    Morbid obesity    Shingles outbreak  (2015)  UTI (urinary tract infection)  Recurrent (last infection 1 month ago) Arthritis  hx bilateral knee replacements  Dysphagia  occasional with solids, denies any choking, saw gastroenterologist (Dr. Lila Vilchis) recently  Endometrial cancer    HLD (hyperlipidemia)    HTN (hypertension)    HTN (hypertension)    Morbid obesity    Shingles outbreak  (2015)  UTI (urinary tract infection)  Recurrent (last infection 1 month ago)

## 2018-08-15 NOTE — H&P PST ADULT - NEGATIVE GENERAL GENITOURINARY SYMPTOMS
no dysuria/no flank pain L/Recurrent UTI's last abx 4 weeks ago/normal urinary frequency/no flank pain R/no hematuria

## 2018-08-15 NOTE — H&P PST ADULT - NEGATIVE NEUROLOGICAL SYMPTOMS
no generalized seizures/no tremors/no headache/no vertigo/no loss of sensation/no weakness/no difficulty walking

## 2018-08-15 NOTE — H&P PST ADULT - PROBLEM SELECTOR PLAN 1
Pt. is scheduled for robotic total abdominal hysterectomy, bilateral salpingo oophorectomy, sentinel lymph node mapping, possible staging on 8/23/18.  Preoperative instructions reviewed, pt verbalized understanding.  Preop Famotidine provided.  Lab results pending, EKG done.  Pt. instructed to obtain medical clearance prior to surgery

## 2018-08-15 NOTE — H&P PST ADULT - HISTORY OF PRESENT ILLNESS
69 y/o female recently diagnosed with endometrial cancer presents to PAST today for presurgical evaluation.  She was seen by her urologist for recurrent UTI's  and referred to her gynecologist.  Sonogram was done and she was told that she had endometrial thickening.  Biopsy confirmed  She is scheduled for 69 y/o female recently diagnosed with endometrial cancer presents to PAST today for presurgical evaluation.  She was seen by her urologist for recurrent UTI's  and referred to her gynecologist.  Sonogram was done and she was told that she had endometrial thickening.  Biopsy confirmed malignancy.  She is scheduled for robotic total abdominal hysterectomy, bilateral salpingo oophorectomy, sentinel lymph node mapping, possible staging on 8/23/18.

## 2018-08-15 NOTE — H&P PST ADULT - FAMILY HISTORY
Grandparent  Still living? Unknown  Family history of coronary artery disease, Age at diagnosis: Age Unknown     Aunt  Still living? Unknown  Family history of diabetes mellitus, Age at diagnosis: Age Unknown  Family history of breast cancer, Age at diagnosis: Age Unknown     Mother  Still living? Unknown  Family history of hypertension, Age at diagnosis: Age Unknown  Family history of COPD (chronic obstructive pulmonary disease), Age at diagnosis: Age Unknown  Family history of diabetes mellitus, Age at diagnosis: Age Unknown     Sibling  Still living? Unknown  Family history of diabetes mellitus, Age at diagnosis: Age Unknown     Father  Still living? No  Family history of hypertension, Age at diagnosis: Age Unknown  Family history of abdominal aortic aneurysm (AAA), Age at diagnosis: Age Unknown

## 2018-08-15 NOTE — H&P PST ADULT - GENITOURINARY COMMENTS
LMP 10 years ago.  Endometrial hyperplasia and polyps  discovered on routine ultrasound during GYN visit.  Denies vaginal bleeding, no pelvic pain LMP 10 years ago.  Hx of Endometrial hyperplasia and polyps, denies vaginal bleeding, no pelvic pain

## 2018-08-15 NOTE — H&P PST ADULT - PROBLEM SELECTOR PLAN 2
Pt. instructed to continue Amlodipine, Bystolic and HCTZ as directed prior to surgery and to take only Amlodipine and Bystolic the morning of surgery with a sip of water

## 2018-08-16 PROBLEM — R13.10 DYSPHAGIA, UNSPECIFIED: Chronic | Status: ACTIVE | Noted: 2018-08-15

## 2018-08-16 LAB — SPECIMEN SOURCE: SIGNIFICANT CHANGE UP

## 2018-08-17 LAB
-  AMIKACIN: SIGNIFICANT CHANGE UP
-  AMPICILLIN/SULBACTAM: SIGNIFICANT CHANGE UP
-  AMPICILLIN: SIGNIFICANT CHANGE UP
-  AZTREONAM: SIGNIFICANT CHANGE UP
-  CEFAZOLIN: SIGNIFICANT CHANGE UP
-  CEFEPIME: SIGNIFICANT CHANGE UP
-  CEFOXITIN: SIGNIFICANT CHANGE UP
-  CEFTAZIDIME: SIGNIFICANT CHANGE UP
-  CEFTRIAXONE: SIGNIFICANT CHANGE UP
-  CIPROFLOXACIN: SIGNIFICANT CHANGE UP
-  ERTAPENEM: SIGNIFICANT CHANGE UP
-  GENTAMICIN: SIGNIFICANT CHANGE UP
-  IMIPENEM: SIGNIFICANT CHANGE UP
-  LEVOFLOXACIN: SIGNIFICANT CHANGE UP
-  MEROPENEM: SIGNIFICANT CHANGE UP
-  NITROFURANTOIN: SIGNIFICANT CHANGE UP
-  PIPERACILLIN/TAZOBACTAM: SIGNIFICANT CHANGE UP
-  TIGECYCLINE: SIGNIFICANT CHANGE UP
-  TOBRAMYCIN: SIGNIFICANT CHANGE UP
-  TRIMETHOPRIM/SULFAMETHOXAZOLE: SIGNIFICANT CHANGE UP
BACTERIA UR CULT: SIGNIFICANT CHANGE UP
METHOD TYPE: SIGNIFICANT CHANGE UP
ORGANISM # SPEC MICROSCOPIC CNT: SIGNIFICANT CHANGE UP
ORGANISM # SPEC MICROSCOPIC CNT: SIGNIFICANT CHANGE UP

## 2018-08-23 ENCOUNTER — APPOINTMENT (OUTPATIENT)
Dept: GYNECOLOGIC ONCOLOGY | Facility: HOSPITAL | Age: 68
End: 2018-08-23

## 2018-08-24 LAB — BACTERIA UR CULT: NORMAL

## 2018-08-27 PROBLEM — C54.1 MALIGNANT NEOPLASM OF ENDOMETRIUM: Chronic | Status: ACTIVE | Noted: 2018-08-15

## 2018-09-05 ENCOUNTER — TRANSCRIPTION ENCOUNTER (OUTPATIENT)
Age: 68
End: 2018-09-05

## 2018-09-05 ENCOUNTER — APPOINTMENT (OUTPATIENT)
Dept: GYNECOLOGIC ONCOLOGY | Facility: CLINIC | Age: 68
End: 2018-09-05

## 2018-09-05 NOTE — ASU PATIENT PROFILE, ADULT - PMH
Arthritis  hx bilateral knee replacements  Dysphagia  occasional with solids, denies any choking, saw gastroenterologist (Dr. Lila Vilchis) recently  Endometrial cancer    HLD (hyperlipidemia)    HTN (hypertension)    HTN (hypertension)    Morbid obesity    Shingles outbreak  (2015)  UTI (urinary tract infection)  Recurrent (last infection 1 month ago)

## 2018-09-06 ENCOUNTER — TRANSCRIPTION ENCOUNTER (OUTPATIENT)
Age: 68
End: 2018-09-06

## 2018-09-06 ENCOUNTER — INPATIENT (INPATIENT)
Facility: HOSPITAL | Age: 68
LOS: 0 days | Discharge: HOME | End: 2018-09-07
Attending: OBSTETRICS & GYNECOLOGY | Admitting: OBSTETRICS & GYNECOLOGY
Payer: COMMERCIAL

## 2018-09-06 ENCOUNTER — APPOINTMENT (OUTPATIENT)
Dept: GYNECOLOGIC ONCOLOGY | Facility: HOSPITAL | Age: 68
End: 2018-09-06

## 2018-09-06 ENCOUNTER — RESULT REVIEW (OUTPATIENT)
Age: 68
End: 2018-09-06

## 2018-09-06 VITALS
TEMPERATURE: 98 F | WEIGHT: 220.02 LBS | DIASTOLIC BLOOD PRESSURE: 84 MMHG | HEART RATE: 55 BPM | OXYGEN SATURATION: 95 % | HEIGHT: 63 IN | RESPIRATION RATE: 18 BRPM | SYSTOLIC BLOOD PRESSURE: 151 MMHG

## 2018-09-06 DIAGNOSIS — Z96.659 PRESENCE OF UNSPECIFIED ARTIFICIAL KNEE JOINT: Chronic | ICD-10-CM

## 2018-09-06 DIAGNOSIS — N85.02 ENDOMETRIAL INTRAEPITHELIAL NEOPLASIA [EIN]: ICD-10-CM

## 2018-09-06 LAB
BLD GP AB SCN SERPL QL: NEGATIVE — SIGNIFICANT CHANGE UP
GLUCOSE BLDC GLUCOMTR-MCNC: 100 MG/DL — HIGH (ref 70–99)
RH IG SCN BLD-IMP: POSITIVE — SIGNIFICANT CHANGE UP

## 2018-09-06 PROCEDURE — S2900 ROBOTIC SURGICAL SYSTEM: CPT | Mod: NC

## 2018-09-06 PROCEDURE — 88342 IMHCHEM/IMCYTCHM 1ST ANTB: CPT | Mod: 26

## 2018-09-06 PROCEDURE — 88309 TISSUE EXAM BY PATHOLOGIST: CPT | Mod: 26

## 2018-09-06 PROCEDURE — 88331 PATH CONSLTJ SURG 1 BLK 1SPC: CPT | Mod: 26

## 2018-09-06 PROCEDURE — 38570 LAPAROSCOPY LYMPH NODE BIOP: CPT

## 2018-09-06 PROCEDURE — 88307 TISSUE EXAM BY PATHOLOGIST: CPT | Mod: 26

## 2018-09-06 PROCEDURE — 58571 TLH W/T/O 250 G OR LESS: CPT

## 2018-09-06 RX ORDER — HYDROCHLOROTHIAZIDE 25 MG
25 TABLET ORAL DAILY
Qty: 0 | Refills: 0 | Status: DISCONTINUED | OUTPATIENT
Start: 2018-09-07 | End: 2018-09-07

## 2018-09-06 RX ORDER — FENTANYL CITRATE 50 UG/ML
50 INJECTION INTRAVENOUS
Qty: 0 | Refills: 0 | Status: DISCONTINUED | OUTPATIENT
Start: 2018-09-06 | End: 2018-09-06

## 2018-09-06 RX ORDER — ONDANSETRON 8 MG/1
4 TABLET, FILM COATED ORAL ONCE
Qty: 0 | Refills: 0 | Status: COMPLETED | OUTPATIENT
Start: 2018-09-06 | End: 2018-09-06

## 2018-09-06 RX ORDER — OXYCODONE HYDROCHLORIDE 5 MG/1
5 TABLET ORAL EVERY 6 HOURS
Qty: 0 | Refills: 0 | Status: DISCONTINUED | OUTPATIENT
Start: 2018-09-06 | End: 2018-09-07

## 2018-09-06 RX ORDER — NEBIVOLOL HYDROCHLORIDE 5 MG/1
1 TABLET ORAL
Qty: 0 | Refills: 0 | COMMUNITY

## 2018-09-06 RX ORDER — HEPARIN SODIUM 5000 [USP'U]/ML
5000 INJECTION INTRAVENOUS; SUBCUTANEOUS ONCE
Qty: 0 | Refills: 0 | Status: COMPLETED | OUTPATIENT
Start: 2018-09-06 | End: 2018-09-06

## 2018-09-06 RX ORDER — KETOROLAC TROMETHAMINE 30 MG/ML
30 SYRINGE (ML) INJECTION EVERY 8 HOURS
Qty: 0 | Refills: 0 | Status: DISCONTINUED | OUTPATIENT
Start: 2018-09-06 | End: 2018-09-07

## 2018-09-06 RX ORDER — HEPARIN SODIUM 5000 [USP'U]/ML
5000 INJECTION INTRAVENOUS; SUBCUTANEOUS EVERY 8 HOURS
Qty: 0 | Refills: 0 | Status: DISCONTINUED | OUTPATIENT
Start: 2018-09-06 | End: 2018-09-07

## 2018-09-06 RX ORDER — SODIUM CHLORIDE 9 MG/ML
1000 INJECTION, SOLUTION INTRAVENOUS
Qty: 0 | Refills: 0 | Status: DISCONTINUED | OUTPATIENT
Start: 2018-09-06 | End: 2018-09-07

## 2018-09-06 RX ORDER — ACETAMINOPHEN 500 MG
650 TABLET ORAL EVERY 6 HOURS
Qty: 0 | Refills: 0 | Status: DISCONTINUED | OUTPATIENT
Start: 2018-09-06 | End: 2018-09-07

## 2018-09-06 RX ORDER — METOCLOPRAMIDE HCL 10 MG
10 TABLET ORAL ONCE
Qty: 0 | Refills: 0 | Status: DISCONTINUED | OUTPATIENT
Start: 2018-09-06 | End: 2018-09-07

## 2018-09-06 RX ORDER — FENTANYL CITRATE 50 UG/ML
25 INJECTION INTRAVENOUS
Qty: 0 | Refills: 0 | Status: DISCONTINUED | OUTPATIENT
Start: 2018-09-06 | End: 2018-09-06

## 2018-09-06 RX ORDER — OXYCODONE HYDROCHLORIDE 5 MG/1
1 TABLET ORAL
Qty: 5 | Refills: 0
Start: 2018-09-06

## 2018-09-06 RX ORDER — AMLODIPINE BESYLATE 2.5 MG/1
5 TABLET ORAL DAILY
Qty: 0 | Refills: 0 | Status: DISCONTINUED | OUTPATIENT
Start: 2018-09-07 | End: 2018-09-07

## 2018-09-06 RX ADMIN — ONDANSETRON 4 MILLIGRAM(S): 8 TABLET, FILM COATED ORAL at 20:50

## 2018-09-06 RX ADMIN — HEPARIN SODIUM 5000 UNIT(S): 5000 INJECTION INTRAVENOUS; SUBCUTANEOUS at 12:03

## 2018-09-06 RX ADMIN — SODIUM CHLORIDE 50 MILLILITER(S): 9 INJECTION, SOLUTION INTRAVENOUS at 19:00

## 2018-09-06 RX ADMIN — FENTANYL CITRATE 50 MICROGRAM(S): 50 INJECTION INTRAVENOUS at 19:15

## 2018-09-06 RX ADMIN — FENTANYL CITRATE 50 MICROGRAM(S): 50 INJECTION INTRAVENOUS at 18:15

## 2018-09-06 RX ADMIN — Medication 30 MILLIGRAM(S): at 23:40

## 2018-09-06 RX ADMIN — FENTANYL CITRATE 50 MICROGRAM(S): 50 INJECTION INTRAVENOUS at 17:55

## 2018-09-06 RX ADMIN — SODIUM CHLORIDE 30 MILLILITER(S): 9 INJECTION, SOLUTION INTRAVENOUS at 12:03

## 2018-09-06 RX ADMIN — Medication 30 MILLIGRAM(S): at 23:55

## 2018-09-06 RX ADMIN — FENTANYL CITRATE 50 MICROGRAM(S): 50 INJECTION INTRAVENOUS at 18:10

## 2018-09-06 RX ADMIN — HEPARIN SODIUM 5000 UNIT(S): 5000 INJECTION INTRAVENOUS; SUBCUTANEOUS at 23:40

## 2018-09-06 RX ADMIN — FENTANYL CITRATE 50 MICROGRAM(S): 50 INJECTION INTRAVENOUS at 19:30

## 2018-09-06 RX ADMIN — FENTANYL CITRATE 50 MICROGRAM(S): 50 INJECTION INTRAVENOUS at 18:30

## 2018-09-06 NOTE — BRIEF OPERATIVE NOTE - OPERATION/FINDINGS
Uterus about 6 week size with grossly normal cervix, fallopian tubes and ovaries.  Normal liver, diaphragm, omentum, stomach and appendix seen.  ICG mapped to external iliac veins bilaterally. Uterus about 6 week size with grossly normal cervix, fallopian tubes and ovaries.  Normal liver, diaphragm, omentum, stomach and appendix seen.  ICG mapped to external iliac veins bilaterally.  Preliminary pathology shows endometrial polyp.

## 2018-09-06 NOTE — PROGRESS NOTE ADULT - SUBJECTIVE AND OBJECTIVE BOX
PA GYN/ONC POST OP NOTE:    Pt resting in PACU, medicated earlier for pain which is better but now with c/o nausea and to get dose of Zofran. Ruiz catheter D/C'd at 6PM and Pt is DTV.     Vital Signs Last 24 Hrs  T(C): 36.6 (06 Sep 2018 21:00), Max: 36.9 (06 Sep 2018 11:12)  T(F): 97.9 (06 Sep 2018 21:00), Max: 98.4 (06 Sep 2018 11:12)  HR: 52 (06 Sep 2018 21:00) (42 - 66)  BP: 129/73 (06 Sep 2018 21:00) (116/63 - 151/84)  BP(mean): --  RR: 20 (06 Sep 2018 21:00) (12 - 20)  SpO2: 99% (06 Sep 2018 21:00) (95% - 100%)    U/O:    I&O's Detail    06 Sep 2018 07:01  -  06 Sep 2018 21:17  --------------------------------------------------------  IN:    lactated ringers.: 75 mL  Total IN: 75 mL    OUT:    Indwelling Catheter - Urethral: 75 mL  Total OUT: 75 mL    Total NET: 0 mL    PHYSICAL EXAM:  CHEST/LUNG: CTA B/L  HEART: S1S2   ABDOMEN: Soft, appropriate tenderness  INCISION: Scope sites C/D/I  EXTREMITIES: NT B/L, Pt with Venodynes on for DVT ppx     LABS:Tor    MEDICATIONS  (STANDING):  heparin  Injectable 5000 Unit(s) SubCutaneous every 8 hours  ketorolac   Injectable 30 milliGRAM(s) IV Push every 8 hours  lactated ringers. 1000 milliLiter(s) (50 mL/Hr) IV Continuous <Continuous>    MEDICATIONS  (PRN):  acetaminophen   Tablet .. 650 milliGRAM(s) Oral every 6 hours PRN Mild Pain (1 - 3), Moderate Pain (4 - 6)  fentaNYL    Injectable 25 MICROGram(s) IV Push every 5 minutes PRN Moderate Pain (4 - 6)  fentaNYL    Injectable 50 MICROGram(s) IV Push every 10 minutes PRN Severe Pain (7 - 10)  oxyCODONE    IR 5 milliGRAM(s) Oral every 6 hours PRN Severe Pain (7 - 10) PA GYN/ONC POST OP NOTE:    Pt resting in PACU, medicated earlier with Fentanyl for pain which is better but now with c/o nausea and getting dose of Zofran. Ruiz catheter D/C'd at 6PM and Pt is DTV.     Vital Signs Last 24 Hrs  T(C): 36.6 (06 Sep 2018 21:00), Max: 36.9 (06 Sep 2018 11:12)  T(F): 97.9 (06 Sep 2018 21:00), Max: 98.4 (06 Sep 2018 11:12)  HR: 52 (06 Sep 2018 21:00) (42 - 66)  BP: 129/73 (06 Sep 2018 21:00) (116/63 - 151/84)  BP(mean): --  RR: 20 (06 Sep 2018 21:00) (12 - 20)  SpO2: 99% (06 Sep 2018 21:00) (95% - 100%)    U/O:    I&O's Detail    06 Sep 2018 07:01  -  06 Sep 2018 21:17  --------------------------------------------------------  IN:    lactated ringers.: 75 mL  Total IN: 75 mL    OUT:    Indwelling Catheter - Urethral: 75 mL  Total OUT: 75 mL    Total NET: 0 mL    PHYSICAL EXAM:  CHEST/LUNG: CTA B/L  HEART: S1S2   ABDOMEN: Soft, appropriate tenderness  INCISION: Scope sites C/D/I  EXTREMITIES: NT B/L, Pt with Venodynes on for DVT ppx       MEDICATIONS  (STANDING):  heparin  Injectable 5000 Unit(s) SubCutaneous every 8 hours  ketorolac   Injectable 30 milliGRAM(s) IV Push every 8 hours  lactated ringers. 1000 milliLiter(s) (50 mL/Hr) IV Continuous <Continuous>    MEDICATIONS  (PRN):  acetaminophen   Tablet .. 650 milliGRAM(s) Oral every 6 hours PRN Mild Pain (1 - 3), Moderate Pain (4 - 6)  fentaNYL    Injectable 25 MICROGram(s) IV Push every 5 minutes PRN Moderate Pain (4 - 6)  fentaNYL    Injectable 50 MICROGram(s) IV Push every 10 minutes PRN Severe Pain (7 - 10)  oxyCODONE    IR 5 milliGRAM(s) Oral every 6 hours PRN Severe Pain (7 - 10)

## 2018-09-06 NOTE — DISCHARGE NOTE ADULT - CARE PLAN
Principal Discharge DX:	Pelvic mass  Goal:	Return to normal state  Assessment and plan of treatment:	Regular diet as tolerated, regular activity as tolerated, no heavy lifting for first two weeks.  Nothing per vagina: no intercourse, tampons or douching.  Call your provider if you experience fevers, chills, worsening abdominal pain, inability to urinate or worsening vaginal bleeding.  Follow up with your provider in 2 weeks. Principal Discharge DX:	Pelvic mass  Goal:	Return to normal state  Assessment and plan of treatment:	Regular diet as tolerated, regular activity as tolerated, no heavy lifting for first 6weeks.  Nothing per vagina: no intercourse, tampons or douching.  Call your provider if you experience fevers, chills, worsening abdominal pain, inability to urinate or worsening vaginal bleeding.  Follow up with your provider Dr. Venegas for scheduled appointment on 9/19 at 10am.

## 2018-09-06 NOTE — DISCHARGE NOTE ADULT - MEDICATION SUMMARY - MEDICATIONS TO TAKE
I will START or STAY ON the medications listed below when I get home from the hospital:    Vitamin B  -- 1 tab(s) by mouth once a day  -- Indication: For home med    Motrin 600 mg oral tablet  -- 1 tab(s) by mouth every 6 hours, As Needed  -- Indication: For prn pain    aspirin 81 mg oral tablet  -- 1 tab(s) by mouth once a day  -- Indication: For home med    amLODIPine 5 mg oral tablet  -- 1 tab(s) by mouth once a day  -- Indication: For home med    hydroCHLOROthiazide 25 mg oral tablet  -- 1 tab(s) by mouth once a day  -- Indication: For home med    lutein 20 mg oral tablet  -- 1 tab(s) by mouth once a day. last dose 8/15/2018  -- Indication: For home med    CoQ10 300 mg oral capsule  -- 1 cap(s) by mouth once a day  -- Indication: For home med I will START or STAY ON the medications listed below when I get home from the hospital:    Vitamin B  -- 1 tab(s) by mouth once a day  -- Indication: For home med    aspirin 81 mg oral tablet  -- 1 tab(s) by mouth once a day  -- Indication: For home med    Motrin 600 mg oral tablet  -- 1 tab(s) by mouth every 6 hours, As Needed  -- Indication: For prn pain    oxyCODONE 5 mg oral tablet  -- 1 tab(s) by mouth every 6 hours, As Needed -for pain MDD:4  -- Caution federal law prohibits the transfer of this drug to any person other  than the person for whom it was prescribed.  It is very important that you take or use this exactly as directed.  Do not skip doses or discontinue unless directed by your doctor.  May cause drowsiness.  Alcohol may intensify this effect.  Use care when operating dangerous machinery.  This prescription cannot be refilled.  Using more of this medication than prescribed may cause serious breathing problems.    -- Indication: For Prn severe pain    Tylenol 325 mg oral capsule  -- 2 tab(s) by mouth every 6 hours, As Needed  -- Indication: For Prn mild to moderate pain    amLODIPine 5 mg oral tablet  -- 1 tab(s) by mouth once a day  -- Indication: For home med    hydroCHLOROthiazide 25 mg oral tablet  -- 1 tab(s) by mouth once a day  -- Indication: For home med    lutein 20 mg oral tablet  -- 1 tab(s) by mouth once a day. last dose 8/15/2018  -- Indication: For home med    CoQ10 300 mg oral capsule  -- 1 cap(s) by mouth once a day  -- Indication: For home med

## 2018-09-06 NOTE — BRIEF OPERATIVE NOTE - PROCEDURE
<<-----Click on this checkbox to enter Procedure [place] lymph node biopsy  09/06/2018    Active  JOSETTE  Hysterectomy, robot-assisted, laparoscopic, with bilateral salpingo-oophorectomy  09/06/2018    [facility]

## 2018-09-06 NOTE — BRIEF OPERATIVE NOTE - SPECIMENS
Right sentinel pelvic lymph node, left sentinel pelvic lymph node, uterus with cervix and bilateral tubes and ovaries

## 2018-09-06 NOTE — DISCHARGE NOTE ADULT - PLAN OF CARE
Return to normal state Regular diet as tolerated, regular activity as tolerated, no heavy lifting for first two weeks.  Nothing per vagina: no intercourse, tampons or douching.  Call your provider if you experience fevers, chills, worsening abdominal pain, inability to urinate or worsening vaginal bleeding.  Follow up with your provider in 2 weeks. Regular diet as tolerated, regular activity as tolerated, no heavy lifting for first 6weeks.  Nothing per vagina: no intercourse, tampons or douching.  Call your provider if you experience fevers, chills, worsening abdominal pain, inability to urinate or worsening vaginal bleeding.  Follow up with your provider Dr. Venegas for scheduled appointment on 9/19 at 10am.

## 2018-09-06 NOTE — DISCHARGE NOTE ADULT - CARE PROVIDER_API CALL
Lulu Venegas), Gynecologic Oncology; Obstetrics and Gynecology  [address] [place] [address]  Phone: [phone]  Fax: [phone]

## 2018-09-06 NOTE — DISCHARGE NOTE ADULT - HOSPITAL COURSE
y/o s/p RA [facility]-BSO, SLND.  Please see operative note for details.  The patient was transferred to the floor post-op in stable condition with oral medications for pain control, and with a  regular diet. POD#1 The patient was transitioned to PO pain medication, the patient voiding, and tolerating regular diet. On the day of discharge the patient is afebrile and hemodynamically stable. She is ambulating without assistance, voiding spontaneously, and tolerating regular diet. Her pain is well controlled on oral medication. She is cleared for discharge with instructions for appropriate follow up. 67y/o s/p RA [facility]-BSO, SLND.  Please see operative note for details.  The patient was transferred to the floor post-op in stable condition with oral medications for pain control, and with a  regular diet. POD#1 The patient was transitioned to PO pain medication, the patient voiding, and tolerating regular diet. On the day of discharge the patient is afebrile and hemodynamically stable. She is ambulating without assistance, voiding spontaneously, and tolerating regular diet. Her pain is well controlled on oral medication. She is cleared for discharge with instructions for appropriate follow up scheduled for 9/19 at 10am

## 2018-09-06 NOTE — DISCHARGE NOTE ADULT - CONDITIONS AT DISCHARGE
Vitals stable, tolerated regular diet and voiding without difficulty.   And lap site x5 clean, dry and intact with abd binder in place.   Pt verbalize all discharge and medication instructions including the need for MD follow up visit.

## 2018-09-06 NOTE — DISCHARGE NOTE ADULT - PATIENT PORTAL LINK FT
You can access the [facility] Patient Portal, offered by [facility], by registering with the following website: [email or web address]

## 2018-09-07 ENCOUNTER — INBOUND DOCUMENT (OUTPATIENT)
Age: 68
End: 2018-09-07

## 2018-09-07 VITALS
HEART RATE: 63 BPM | SYSTOLIC BLOOD PRESSURE: 118 MMHG | RESPIRATION RATE: 17 BRPM | TEMPERATURE: 98 F | DIASTOLIC BLOOD PRESSURE: 59 MMHG | OXYGEN SATURATION: 95 %

## 2018-09-07 DIAGNOSIS — Z98.890 OTHER SPECIFIED POSTPROCEDURAL STATES: ICD-10-CM

## 2018-09-07 LAB
BUN SERPL-MCNC: 16 MG/DL — SIGNIFICANT CHANGE UP (ref 7–23)
CALCIUM SERPL-MCNC: 9.2 MG/DL — SIGNIFICANT CHANGE UP (ref 8.4–10.5)
CHLORIDE SERPL-SCNC: 100 MMOL/L — SIGNIFICANT CHANGE UP (ref 98–107)
CO2 SERPL-SCNC: 25 MMOL/L — SIGNIFICANT CHANGE UP (ref 22–31)
CREAT SERPL-MCNC: 0.67 MG/DL — SIGNIFICANT CHANGE UP (ref 0.5–1.3)
GLUCOSE SERPL-MCNC: 127 MG/DL — HIGH (ref 70–99)
HCT VFR BLD CALC: 40.4 % — SIGNIFICANT CHANGE UP (ref 34.5–45)
HGB BLD-MCNC: 12.6 G/DL — SIGNIFICANT CHANGE UP (ref 11.5–15.5)
MAGNESIUM SERPL-MCNC: 1.9 MG/DL — SIGNIFICANT CHANGE UP (ref 1.6–2.6)
MCHC RBC-ENTMCNC: 27.8 PG — SIGNIFICANT CHANGE UP (ref 27–34)
MCHC RBC-ENTMCNC: 31.2 % — LOW (ref 32–36)
MCV RBC AUTO: 89.2 FL — SIGNIFICANT CHANGE UP (ref 80–100)
NRBC # FLD: 0 — SIGNIFICANT CHANGE UP
PHOSPHATE SERPL-MCNC: 3.6 MG/DL — SIGNIFICANT CHANGE UP (ref 2.5–4.5)
PLATELET # BLD AUTO: 232 K/UL — SIGNIFICANT CHANGE UP (ref 150–400)
PMV BLD: 12 FL — SIGNIFICANT CHANGE UP (ref 7–13)
POTASSIUM SERPL-MCNC: 4 MMOL/L — SIGNIFICANT CHANGE UP (ref 3.5–5.3)
POTASSIUM SERPL-SCNC: 4 MMOL/L — SIGNIFICANT CHANGE UP (ref 3.5–5.3)
RBC # BLD: 4.53 M/UL — SIGNIFICANT CHANGE UP (ref 3.8–5.2)
RBC # FLD: 13.4 % — SIGNIFICANT CHANGE UP (ref 10.3–14.5)
SODIUM SERPL-SCNC: 141 MMOL/L — SIGNIFICANT CHANGE UP (ref 135–145)
WBC # BLD: 13.19 K/UL — HIGH (ref 3.8–10.5)
WBC # FLD AUTO: 13.19 K/UL — HIGH (ref 3.8–10.5)

## 2018-09-07 RX ORDER — INFLUENZA VIRUS VACCINE 15; 15; 15; 15 UG/.5ML; UG/.5ML; UG/.5ML; UG/.5ML
0.5 SUSPENSION INTRAMUSCULAR ONCE
Qty: 0 | Refills: 0 | Status: DISCONTINUED | OUTPATIENT
Start: 2018-09-07 | End: 2018-09-07

## 2018-09-07 RX ORDER — AMLODIPINE BESYLATE 2.5 MG/1
5 TABLET ORAL DAILY
Qty: 0 | Refills: 0 | Status: DISCONTINUED | OUTPATIENT
Start: 2018-09-07 | End: 2018-09-07

## 2018-09-07 RX ORDER — BENZOCAINE AND MENTHOL 5; 1 G/100ML; G/100ML
1 LIQUID ORAL
Qty: 0 | Refills: 0 | Status: DISCONTINUED | OUTPATIENT
Start: 2018-09-07 | End: 2018-09-07

## 2018-09-07 RX ORDER — ACETAMINOPHEN 500 MG
650 TABLET ORAL EVERY 6 HOURS
Qty: 0 | Refills: 0 | Status: DISCONTINUED | OUTPATIENT
Start: 2018-09-07 | End: 2018-09-07

## 2018-09-07 RX ORDER — SODIUM CHLORIDE 9 MG/ML
1000 INJECTION, SOLUTION INTRAVENOUS
Qty: 0 | Refills: 0 | Status: DISCONTINUED | OUTPATIENT
Start: 2018-09-07 | End: 2018-09-07

## 2018-09-07 RX ADMIN — BENZOCAINE AND MENTHOL 1 LOZENGE: 5; 1 LIQUID ORAL at 08:16

## 2018-09-07 RX ADMIN — OXYCODONE HYDROCHLORIDE 5 MILLIGRAM(S): 5 TABLET ORAL at 04:12

## 2018-09-07 RX ADMIN — AMLODIPINE BESYLATE 5 MILLIGRAM(S): 2.5 TABLET ORAL at 05:41

## 2018-09-07 RX ADMIN — Medication 30 MILLIGRAM(S): at 09:00

## 2018-09-07 RX ADMIN — OXYCODONE HYDROCHLORIDE 5 MILLIGRAM(S): 5 TABLET ORAL at 04:58

## 2018-09-07 RX ADMIN — HEPARIN SODIUM 5000 UNIT(S): 5000 INJECTION INTRAVENOUS; SUBCUTANEOUS at 08:21

## 2018-09-07 RX ADMIN — Medication 30 MILLIGRAM(S): at 08:16

## 2018-09-07 NOTE — PROGRESS NOTE ADULT - ATTENDING COMMENTS
Pt seen and examined with team  doing well, pain better controlled after iv tylenol  no n/v/cp/sob  lucila reg breakfast  NAD  Abd soft/nt/incision c/d/i  Ext NT  Plan  GI lucila reg diet   voiding trial tomorrow  DVT proph scds/sqh  d/c pca and transition to po meds Pt seen and examined with team  pain controlled  no n/v/cp/sob  lucila reg breakfast  NAD  Abd soft/nt/incision c/d/i  Ext NT  Plan  GI lucila reg diet   voiding freely  DVT proph scds/sqh  d/c home

## 2018-09-07 NOTE — PROGRESS NOTE ADULT - ASSESSMENT
67 Y/O S/P Robotic [facility]H, BSO, SLND  Plan  1. Patient encouraged to use Incentive Spirometer  2. Pain management; Toradol ATC, Tylenol & Oxycodone PRN  3. Regular diet  4. DTV  5. IV Fluids LR@50mL/hr  6. Heparin 5000units subcut Q8 hours  7. CBC, BMP, Mg & Phos in AM POD#1
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

## 2018-09-07 NOTE — PROGRESS NOTE ADULT - PROBLEM SELECTOR PLAN 1
Neuro: c/w po pain meds, plan to add tylenol for improved control  CV: Hemodynamically stable  Pulm: Saturating well on room air, encourage oob/amb  GI: c/w regular diet  : voiding spontaneously  Heme: c/w HSQ and SCDs for DVT ppx  Dispo: Continue routine post-op care; plan to reassess for discharge after improved pain control achieved     Pt seen and d/w with GYN ONC team and Dr. Luma Medina PGY2 Neuro: c/w po pain meds, plan to add tylenol for improved control. Heat/cold packs for right arm pain   CV: Hemodynamically stable  Pulm: Saturating well on room air, encourage oob/amb  GI: c/w regular diet  : voiding spontaneously  Heme: c/w HSQ and SCDs for DVT ppx  Dispo: Continue routine post-op care; plan to reassess for discharge after improved pain control achieved     Pt seen and d/w with GYN ONC team and Dr. Luma Medina PGY2

## 2018-09-07 NOTE — PROGRESS NOTE ADULT - SUBJECTIVE AND OBJECTIVE BOX
POD#   HD#    Patient seen and examined at bedside, no acute overnight events. No acute complaints, pain well controlled.  Patient is ambulating, passing flatus, voiding spontaneously, and tolerating regular diet. Denies CP, SOB, N/V, fevers, and chills.    Vital Signs Last 24 Hours  T(C): 36.7 (09-07-18 @ 05:21), Max: 36.9 (09-06-18 @ 11:12)  HR: 60 (09-07-18 @ 05:21) (42 - 66)  BP: 120/65 (09-07-18 @ 05:21) (116/63 - 151/84)  RR: 20 (09-07-18 @ 05:21) (12 - 20)  SpO2: 96% (09-07-18 @ 05:21) (95% - 100%)    I&O's Summary    06 Sep 2018 07:01  -  07 Sep 2018 06:18  --------------------------------------------------------  IN: 325 mL / OUT: 425 mL / NET: -100 mL        Physical Exam:  General: NAD  CV: NR, RR, S1, S2, no M/R/G  Lungs: CTA-B  Abdomen: Soft, non-tender, non-distended, +BS  Incision: _ CDI  Ext: No pain or swelling    Labs:                        12.6   13.19 )-----------( 232      ( 07 Sep 2018 04:17 )             40.4   baso x      eos x      imm gran x      lymph x      mono x      poly x        09-07    141  |  100  |  16  ----------------------------<  127<H>  4.0   |  25  |  0.67    Ca    9.2      07 Sep 2018 04:17  Phos  3.6     09-07  Mg     1.9     09-07            Blood Type: O Positive      MEDICATIONS  (STANDING):  amLODIPine   Tablet 5 milliGRAM(s) Oral daily  heparin  Injectable 5000 Unit(s) SubCutaneous every 8 hours  hydrochlorothiazide 25 milliGRAM(s) Oral daily  influenza   Vaccine 0.5 milliLiter(s) IntraMuscular once  ketorolac   Injectable 30 milliGRAM(s) IV Push every 8 hours  lactated ringers. 1000 milliLiter(s) (50 mL/Hr) IV Continuous <Continuous>  metoclopramide Injectable 10 milliGRAM(s) IV Push once    MEDICATIONS  (PRN):  acetaminophen   Tablet .. 650 milliGRAM(s) Oral every 6 hours PRN Mild Pain (1 - 3), Moderate Pain (4 - 6)  oxyCODONE    IR 5 milliGRAM(s) Oral every 6 hours PRN Severe Pain (7 - 10) POD#1   HD#2    Patient seen and examined at bedside, no acute overnight events. Overnight patient complaint of nausea improved with medications and pain moderately only partially improved with oxycodone. +OOP, passing flatus, voiding spontaneously. Patient tolerating minimal intake, preferring clears, ate jello this morning. Denies CP, SOB, vomiting, fevers, and chills.    Vital Signs Last 24 Hours  T(C): 36.7 (09-07-18 @ 05:21), Max: 36.9 (09-06-18 @ 11:12)  HR: 60 (09-07-18 @ 05:21) (42 - 66)  BP: 120/65 (09-07-18 @ 05:21) (116/63 - 151/84)  RR: 20 (09-07-18 @ 05:21) (12 - 20)  SpO2: 96% (09-07-18 @ 05:21) (95% - 100%)    I&O's Summary    06 Sep 2018 07:01  -  07 Sep 2018 06:18  --------------------------------------------------------  IN: 325 mL / OUT: 425 mL / NET: -100 mL        Physical Exam:  General: NAD  CV: NR, RR, S1, S2, no M/R/G  Lungs: CTA-B  Abdomen: Soft, appropriately tender, non-distended, +BS  Incision: 4 port sites clean, dry, and intact  Ext: No pain or swelling    Labs:                        12.6   13.19 )-----------( 232      ( 07 Sep 2018 04:17 )             40.4   baso x      eos x      imm gran x      lymph x      mono x      poly x        09-07    141  |  100  |  16  ----------------------------<  127<H>  4.0   |  25  |  0.67    Ca    9.2      07 Sep 2018 04:17  Phos  3.6     09-07  Mg     1.9     09-07        Blood Type: O Positive      MEDICATIONS  (STANDING):  amLODIPine   Tablet 5 milliGRAM(s) Oral daily  heparin  Injectable 5000 Unit(s) SubCutaneous every 8 hours  hydrochlorothiazide 25 milliGRAM(s) Oral daily  influenza   Vaccine 0.5 milliLiter(s) IntraMuscular once  ketorolac   Injectable 30 milliGRAM(s) IV Push every 8 hours  lactated ringers. 1000 milliLiter(s) (50 mL/Hr) IV Continuous <Continuous>  metoclopramide Injectable 10 milliGRAM(s) IV Push once    MEDICATIONS  (PRN):  acetaminophen   Tablet .. 650 milliGRAM(s) Oral every 6 hours PRN Mild Pain (1 - 3), Moderate Pain (4 - 6)  oxyCODONE    IR 5 milliGRAM(s) Oral every 6 hours PRN Severe Pain (7 - 10) POD#1   HD#2    Patient seen and examined at bedside, no acute overnight events. Overnight patient complaint of nausea improved with medications and pain partially improved with oxycodone. +OOP, passing flatus, voiding spontaneously. Patient tolerating minimal intake, preferring clears, ate jello this morning. Denies CP, SOB, vomiting, fevers, and chills.    Vital Signs Last 24 Hours  T(C): 36.7 (09-07-18 @ 05:21), Max: 36.9 (09-06-18 @ 11:12)  HR: 60 (09-07-18 @ 05:21) (42 - 66)  BP: 120/65 (09-07-18 @ 05:21) (116/63 - 151/84)  RR: 20 (09-07-18 @ 05:21) (12 - 20)  SpO2: 96% (09-07-18 @ 05:21) (95% - 100%)    I&O's Summary    06 Sep 2018 07:01  -  07 Sep 2018 06:18  --------------------------------------------------------  IN: 325 mL / OUT: 425 mL / NET: -100 mL        Physical Exam:  General: NAD  CV: NR, RR, S1, S2, no M/R/G  Lungs: CTA-B  Abdomen: Soft, appropriately tender, non-distended, +BS  Incision: 4 port sites clean, dry, and intact  Ext: No pain or swelling. Bruising at location of IV on anterior forearm.     Labs:                        12.6   13.19 )-----------( 232      ( 07 Sep 2018 04:17 )             40.4   baso x      eos x      imm gran x      lymph x      mono x      poly x        09-07    141  |  100  |  16  ----------------------------<  127<H>  4.0   |  25  |  0.67    Ca    9.2      07 Sep 2018 04:17  Phos  3.6     09-07  Mg     1.9     09-07        Blood Type: O Positive      MEDICATIONS  (STANDING):  amLODIPine   Tablet 5 milliGRAM(s) Oral daily  heparin  Injectable 5000 Unit(s) SubCutaneous every 8 hours  hydrochlorothiazide 25 milliGRAM(s) Oral daily  influenza   Vaccine 0.5 milliLiter(s) IntraMuscular once  ketorolac   Injectable 30 milliGRAM(s) IV Push every 8 hours  lactated ringers. 1000 milliLiter(s) (50 mL/Hr) IV Continuous <Continuous>  metoclopramide Injectable 10 milliGRAM(s) IV Push once    MEDICATIONS  (PRN):  acetaminophen   Tablet .. 650 milliGRAM(s) Oral every 6 hours PRN Mild Pain (1 - 3), Moderate Pain (4 - 6)  oxyCODONE    IR 5 milliGRAM(s) Oral every 6 hours PRN Severe Pain (7 - 10)

## 2018-09-09 ENCOUNTER — FORM ENCOUNTER (OUTPATIENT)
Age: 68
End: 2018-09-09

## 2018-09-10 ENCOUNTER — APPOINTMENT (OUTPATIENT)
Dept: ULTRASOUND IMAGING | Facility: IMAGING CENTER | Age: 68
End: 2018-09-10
Payer: COMMERCIAL

## 2018-09-10 ENCOUNTER — OUTPATIENT (OUTPATIENT)
Dept: OUTPATIENT SERVICES | Facility: HOSPITAL | Age: 68
LOS: 1 days | End: 2018-09-10
Payer: COMMERCIAL

## 2018-09-10 DIAGNOSIS — M79.89 OTHER SPECIFIED SOFT TISSUE DISORDERS: ICD-10-CM

## 2018-09-10 DIAGNOSIS — Z96.659 PRESENCE OF UNSPECIFIED ARTIFICIAL KNEE JOINT: Chronic | ICD-10-CM

## 2018-09-10 PROCEDURE — 93971 EXTREMITY STUDY: CPT | Mod: 26,RT

## 2018-09-10 PROCEDURE — 93971 EXTREMITY STUDY: CPT

## 2018-09-12 LAB — SURGICAL PATHOLOGY STUDY: SIGNIFICANT CHANGE UP

## 2018-09-19 ENCOUNTER — APPOINTMENT (OUTPATIENT)
Dept: GYNECOLOGIC ONCOLOGY | Facility: CLINIC | Age: 68
End: 2018-09-19
Payer: COMMERCIAL

## 2018-09-19 DIAGNOSIS — M25.559 PAIN IN UNSPECIFIED HIP: ICD-10-CM

## 2018-09-19 DIAGNOSIS — N85.02 ENDOMETRIAL INTRAEPITHELIAL NEOPLASIA [EIN]: ICD-10-CM

## 2018-09-19 DIAGNOSIS — Z87.448 PERSONAL HISTORY OF OTHER DISEASES OF URINARY SYSTEM: ICD-10-CM

## 2018-09-19 DIAGNOSIS — Z87.42 PERSONAL HISTORY OF OTHER DISEASES OF THE FEMALE GENITAL TRACT: ICD-10-CM

## 2018-09-19 DIAGNOSIS — Z87.440 PERSONAL HISTORY OF URINARY (TRACT) INFECTIONS: ICD-10-CM

## 2018-09-19 DIAGNOSIS — M79.89 OTHER SPECIFIED SOFT TISSUE DISORDERS: ICD-10-CM

## 2018-09-19 DIAGNOSIS — R14.0 ABDOMINAL DISTENSION (GASEOUS): ICD-10-CM

## 2018-09-19 PROCEDURE — 99024 POSTOP FOLLOW-UP VISIT: CPT

## 2018-09-24 ENCOUNTER — OTHER (OUTPATIENT)
Age: 68
End: 2018-09-24

## 2018-09-27 ENCOUNTER — OTHER (OUTPATIENT)
Age: 68
End: 2018-09-27

## 2018-12-13 ENCOUNTER — OUTPATIENT (OUTPATIENT)
Dept: OUTPATIENT SERVICES | Facility: HOSPITAL | Age: 68
LOS: 1 days | End: 2018-12-13
Payer: COMMERCIAL

## 2018-12-13 ENCOUNTER — APPOINTMENT (OUTPATIENT)
Dept: MAMMOGRAPHY | Facility: IMAGING CENTER | Age: 68
End: 2018-12-13
Payer: COMMERCIAL

## 2018-12-13 DIAGNOSIS — Z96.659 PRESENCE OF UNSPECIFIED ARTIFICIAL KNEE JOINT: Chronic | ICD-10-CM

## 2018-12-13 DIAGNOSIS — Z00.8 ENCOUNTER FOR OTHER GENERAL EXAMINATION: ICD-10-CM

## 2018-12-13 PROCEDURE — 77067 SCR MAMMO BI INCL CAD: CPT | Mod: 26

## 2018-12-13 PROCEDURE — 77063 BREAST TOMOSYNTHESIS BI: CPT | Mod: 26

## 2018-12-13 PROCEDURE — 77063 BREAST TOMOSYNTHESIS BI: CPT

## 2018-12-13 PROCEDURE — 77067 SCR MAMMO BI INCL CAD: CPT

## 2018-12-13 NOTE — PATIENT PROFILE ADULT. - PAIN SCALE PREFERRED, PROFILE
[id number] numerical [id number]

## 2019-01-02 ENCOUNTER — CHART COPY (OUTPATIENT)
Age: 69
End: 2019-01-02

## 2019-01-02 DIAGNOSIS — Z96.651 PRESENCE OF RIGHT ARTIFICIAL KNEE JOINT: ICD-10-CM

## 2019-04-17 ENCOUNTER — RESULT REVIEW (OUTPATIENT)
Age: 69
End: 2019-04-17

## 2019-06-21 ENCOUNTER — CHART COPY (OUTPATIENT)
Age: 69
End: 2019-06-21

## 2019-06-26 ENCOUNTER — APPOINTMENT (OUTPATIENT)
Dept: OTOLARYNGOLOGY | Facility: CLINIC | Age: 69
End: 2019-06-26

## 2020-02-24 ENCOUNTER — OUTPATIENT (OUTPATIENT)
Dept: OUTPATIENT SERVICES | Facility: HOSPITAL | Age: 70
LOS: 1 days | End: 2020-02-24
Payer: MEDICARE

## 2020-02-24 ENCOUNTER — APPOINTMENT (OUTPATIENT)
Dept: MAMMOGRAPHY | Facility: IMAGING CENTER | Age: 70
End: 2020-02-24
Payer: MEDICARE

## 2020-02-24 DIAGNOSIS — Z00.8 ENCOUNTER FOR OTHER GENERAL EXAMINATION: ICD-10-CM

## 2020-02-24 DIAGNOSIS — Z96.659 PRESENCE OF UNSPECIFIED ARTIFICIAL KNEE JOINT: Chronic | ICD-10-CM

## 2020-02-24 PROCEDURE — 77063 BREAST TOMOSYNTHESIS BI: CPT

## 2020-02-24 PROCEDURE — 77067 SCR MAMMO BI INCL CAD: CPT | Mod: 26

## 2020-02-24 PROCEDURE — 77067 SCR MAMMO BI INCL CAD: CPT

## 2020-02-24 PROCEDURE — 77063 BREAST TOMOSYNTHESIS BI: CPT | Mod: 26

## 2020-03-09 ENCOUNTER — OUTPATIENT (OUTPATIENT)
Dept: OUTPATIENT SERVICES | Facility: HOSPITAL | Age: 70
LOS: 1 days | End: 2020-03-09
Payer: MEDICARE

## 2020-03-09 ENCOUNTER — APPOINTMENT (OUTPATIENT)
Dept: CT IMAGING | Facility: CLINIC | Age: 70
End: 2020-03-09
Payer: MEDICARE

## 2020-03-09 DIAGNOSIS — Z00.8 ENCOUNTER FOR OTHER GENERAL EXAMINATION: ICD-10-CM

## 2020-03-09 DIAGNOSIS — Z96.659 PRESENCE OF UNSPECIFIED ARTIFICIAL KNEE JOINT: Chronic | ICD-10-CM

## 2020-03-09 PROCEDURE — 82565 ASSAY OF CREATININE: CPT

## 2020-03-09 PROCEDURE — 74177 CT ABD & PELVIS W/CONTRAST: CPT | Mod: 26

## 2020-03-09 PROCEDURE — 74177 CT ABD & PELVIS W/CONTRAST: CPT

## 2020-04-09 ENCOUNTER — APPOINTMENT (OUTPATIENT)
Dept: ORTHOPEDIC SURGERY | Facility: CLINIC | Age: 70
End: 2020-04-09
Payer: MEDICARE

## 2020-04-09 VITALS — BODY MASS INDEX: 3.54 KG/M2 | TEMPERATURE: 98.4 F | HEIGHT: 63 IN | WEIGHT: 20 LBS

## 2020-04-09 PROCEDURE — 72170 X-RAY EXAM OF PELVIS: CPT

## 2020-04-09 PROCEDURE — 72070 X-RAY EXAM THORAC SPINE 2VWS: CPT

## 2020-04-09 PROCEDURE — 99203 OFFICE O/P NEW LOW 30 MIN: CPT

## 2020-04-09 PROCEDURE — 72100 X-RAY EXAM L-S SPINE 2/3 VWS: CPT

## 2020-04-09 RX ORDER — OLMESARTAN MEDOXOMIL 40 MG/1
TABLET, FILM COATED ORAL
Refills: 0 | Status: ACTIVE | COMMUNITY

## 2020-05-01 ENCOUNTER — APPOINTMENT (OUTPATIENT)
Dept: ULTRASOUND IMAGING | Facility: IMAGING CENTER | Age: 70
End: 2020-05-01

## 2020-05-05 ENCOUNTER — APPOINTMENT (OUTPATIENT)
Dept: RHEUMATOLOGY | Facility: CLINIC | Age: 70
End: 2020-05-05

## 2020-05-07 ENCOUNTER — APPOINTMENT (OUTPATIENT)
Dept: ORTHOPEDIC SURGERY | Facility: CLINIC | Age: 70
End: 2020-05-07
Payer: MEDICARE

## 2020-05-07 VITALS — BODY MASS INDEX: 38.97 KG/M2 | WEIGHT: 220 LBS

## 2020-05-07 VITALS — TEMPERATURE: 96 F

## 2020-05-07 VITALS — HEIGHT: 63 IN

## 2020-05-07 DIAGNOSIS — S22.39XA FRACTURE OF ONE RIB, UNSPECIFIED SIDE, INITIAL ENCOUNTER FOR CLOSED FRACTURE: ICD-10-CM

## 2020-05-07 PROCEDURE — 99213 OFFICE O/P EST LOW 20 MIN: CPT

## 2020-05-11 ENCOUNTER — RESULT REVIEW (OUTPATIENT)
Age: 70
End: 2020-05-11

## 2020-10-23 DIAGNOSIS — Z20.828 CONTACT WITH AND (SUSPECTED) EXPOSURE TO OTHER VIRAL COMMUNICABLE DISEASES: ICD-10-CM

## 2020-11-04 LAB — SARS-COV-2 N GENE NPH QL NAA+PROBE: NOT DETECTED

## 2020-11-05 ENCOUNTER — APPOINTMENT (OUTPATIENT)
Dept: PULMONOLOGY | Facility: CLINIC | Age: 70
End: 2020-11-05
Payer: MEDICARE

## 2020-11-05 VITALS
DIASTOLIC BLOOD PRESSURE: 77 MMHG | HEART RATE: 96 BPM | BODY MASS INDEX: 41.11 KG/M2 | WEIGHT: 232 LBS | TEMPERATURE: 97.2 F | OXYGEN SATURATION: 96 % | HEIGHT: 63 IN | RESPIRATION RATE: 16 BRPM | SYSTOLIC BLOOD PRESSURE: 118 MMHG

## 2020-11-05 DIAGNOSIS — Z23 ENCOUNTER FOR IMMUNIZATION: ICD-10-CM

## 2020-11-05 LAB — POCT - HEMOGLOBIN (HGB), QUANTITATIVE, TRANSCUTANEOUS: 15.1

## 2020-11-05 PROCEDURE — 90670 PCV13 VACCINE IM: CPT

## 2020-11-05 PROCEDURE — 94010 BREATHING CAPACITY TEST: CPT

## 2020-11-05 PROCEDURE — 88738 HGB QUANT TRANSCUTANEOUS: CPT

## 2020-11-05 PROCEDURE — 71046 X-RAY EXAM CHEST 2 VIEWS: CPT

## 2020-11-05 PROCEDURE — 99205 OFFICE O/P NEW HI 60 MIN: CPT | Mod: CS,25

## 2020-11-05 PROCEDURE — ZZZZZ: CPT

## 2020-11-05 PROCEDURE — G0009: CPT

## 2020-11-05 RX ORDER — HYDROCHLOROTHIAZIDE 25 MG/1
25 TABLET ORAL
Qty: 90 | Refills: 0 | Status: ACTIVE | COMMUNITY
Start: 2020-11-05

## 2020-11-05 RX ORDER — AMLODIPINE BESYLATE 5 MG/1
TABLET ORAL
Refills: 0 | Status: DISCONTINUED | COMMUNITY
End: 2020-11-05

## 2020-11-05 RX ORDER — SULFAMETHOXAZOLE AND TRIMETHOPRIM 800; 160 MG/1; MG/1
800-160 TABLET ORAL TWICE DAILY
Qty: 6 | Refills: 0 | Status: DISCONTINUED | COMMUNITY
Start: 2018-08-17 | End: 2020-11-05

## 2020-11-05 RX ORDER — AMOXICILLIN 500 MG/1
500 CAPSULE ORAL
Qty: 20 | Refills: 3 | Status: DISCONTINUED | COMMUNITY
Start: 2019-06-21 | End: 2020-11-05

## 2020-11-05 RX ORDER — TRAMADOL HYDROCHLORIDE 50 MG/1
50 TABLET, COATED ORAL EVERY 8 HOURS
Qty: 40 | Refills: 0 | Status: DISCONTINUED | COMMUNITY
Start: 2020-04-09 | End: 2020-11-05

## 2020-11-05 RX ORDER — MONTELUKAST 10 MG/1
10 TABLET, FILM COATED ORAL
Qty: 1 | Refills: 3 | Status: ACTIVE | COMMUNITY
Start: 2020-11-05 | End: 1900-01-01

## 2020-11-05 RX ORDER — AMOXICILLIN AND CLAVULANATE POTASSIUM 500; 125 MG/1; MG/1
500-125 TABLET, FILM COATED ORAL
Qty: 6 | Refills: 0 | Status: DISCONTINUED | COMMUNITY
Start: 2018-08-19 | End: 2020-11-05

## 2020-11-05 RX ORDER — ALBUTEROL SULFATE 90 UG/1
108 (90 BASE) INHALANT RESPIRATORY (INHALATION)
Qty: 1 | Refills: 3 | Status: ACTIVE | COMMUNITY
Start: 2020-11-05 | End: 1900-01-01

## 2020-11-05 RX ORDER — AMOXICILLIN 500 MG/1
500 CAPSULE ORAL
Qty: 20 | Refills: 3 | Status: DISCONTINUED | COMMUNITY
Start: 2019-01-02 | End: 2020-11-05

## 2020-11-05 NOTE — HISTORY OF PRESENT ILLNESS
[Never] : never [TextBox_4] : Patient seen as a new visit\par Did review old data that was available\par Chief complaint chest congestion intermittent daily cough\par Dominantly dry cough\par States supine wheezing\par Also states she feels short of breath with an occasional wheeze as well with laughing\par She is walking 4 miles a day but she says she only gets dyspnea with inclines\par Does not describe any significant purulent sputum hemoptysis\par No fevers chills or sweats\par Denies lower extremity edema\par No pleuritic chest pain or exertional chest pain reported\par As noted below prior data reviewed and has been treated in the past for asthmatic bronchitis\par Currently on no inhalers\par Non-smoker\par No history of chemical toxic inhalational exposures\par

## 2020-11-05 NOTE — PROCEDURE
[FreeTextEntry1] : PFT no bronchodilator November 5, 2020\par Well-preserved normal flow rates\par Mild obstructive pattern\par FEV1 FVC ratio is very mildly reduced 74\par Normal lung volumes\par  diffusion 72% predicted normal range\par Compared data dating back from April 2014 through May 2017 there is no decline of the flow rates although noted some decline in diffusion\par No patient is of short stature with increased weight of 225 pounds\par Also noted is a decline of the total lung capacity from 21.16 April 2017-4.80\par \par Non-small cell lung cancer\par Cerebral metastases chest x-ray PA lateral November 2020\par Borderline cardiomegaly\par Mild elevation right hemidiaphragm\par Grossly clear lung fields without parenchymal infiltrates pleural effusions or dominant pulmonary nodules\par Soft tissue bony structures grossly unremarkable\par Etelvina mediastinum demonstrates a mild calcification aortic knob\par \par Gross interval change compared to chest x-ray dating back to April 26, 2017\par \par Review of chest CT of May 8, 2017\par No pulmonary masses consolidation suspicious nodules\par Notation of the ascending thoracic aorta measured 4.3 cm\par No hiatal hernia\par Mild bronchiectasis right lower lobe\par Table linear atelectasis versus scar right lower lobe\par Mild\par Minor reticular opacities peripherally of lingula with mild atelectasis versus scarring\par Stable atelectasis right base\par Calcified granuloma right upper lobe\par Right upper lobe 2 mm noncalcified pulmonary nodule with benign\par \par Note this study was compared to study of September 13, 2016\par \par Unable to perform NIOX maneuver

## 2020-11-05 NOTE — DISCUSSION/SUMMARY
[FreeTextEntry1] : Mild airway disease chronic cough bronchospasm\par Noted decline at the total lung capacity and diffusion\par Based on a review of the prior CAT scan of the chest although no clinical signs of interstitial lung disease with crackles cannot rule out more subtle interstitial lung disease NSIP\par patient to bring the prior ? CT ABD vs chest for  review\par We will treat inflammatory airway disease\par  PFT 1 month\par CT chest follow-up\par Singulair 1 tablet p.o. daily with food \par ANORO 1 puff QD\par ProAir rescue inhaler 2 puffs every 6 hours as needed with MDI instruction\par Notify of any wheezing.  Notify if rescue inhaler is needed greater than 2-3 times in any week.  Avoid known triggers.\par \par at f/u issue ICS\par

## 2020-12-01 LAB — SARS-COV-2 N GENE NPH QL NAA+PROBE: NOT DETECTED

## 2020-12-03 ENCOUNTER — APPOINTMENT (OUTPATIENT)
Dept: PULMONOLOGY | Facility: CLINIC | Age: 70
End: 2020-12-03
Payer: MEDICARE

## 2020-12-03 VITALS
RESPIRATION RATE: 15 BRPM | SYSTOLIC BLOOD PRESSURE: 173 MMHG | HEART RATE: 81 BPM | DIASTOLIC BLOOD PRESSURE: 105 MMHG | OXYGEN SATURATION: 93 % | HEIGHT: 63 IN | BODY MASS INDEX: 40.75 KG/M2 | WEIGHT: 230 LBS | TEMPERATURE: 97.3 F

## 2020-12-03 PROCEDURE — 94750: CPT

## 2020-12-03 PROCEDURE — 94729 DIFFUSING CAPACITY: CPT

## 2020-12-03 PROCEDURE — 94726 PLETHYSMOGRAPHY LUNG VOLUMES: CPT

## 2020-12-03 PROCEDURE — ZZZZZ: CPT

## 2020-12-03 PROCEDURE — 94010 BREATHING CAPACITY TEST: CPT

## 2020-12-03 PROCEDURE — 95012 NITRIC OXIDE EXP GAS DETER: CPT

## 2020-12-03 PROCEDURE — 99214 OFFICE O/P EST MOD 30 MIN: CPT | Mod: 25

## 2020-12-03 RX ORDER — UMECLIDINIUM BROMIDE AND VILANTEROL TRIFENATATE 62.5; 25 UG/1; UG/1
62.5-25 POWDER RESPIRATORY (INHALATION)
Qty: 1 | Refills: 3 | Status: ACTIVE | COMMUNITY
Start: 2020-11-05 | End: 1900-01-01

## 2020-12-03 NOTE — PROCEDURE
[FreeTextEntry1] : Repeat PFT\par Box December 3, 2020\par Flow rates are normal\par FEV1 FVC ratio 78\par Normal lung volumes by box\par Specific inductance and resistance are normal\par Diffusion normal 84% predicted.\par NIOX 15 normal range December 3, 2020\par \par PFT no bronchodilator November 5, 2020\par Well-preserved normal flow rates\par Mild obstructive pattern\par FEV1 FVC ratio is very mildly reduced 74\par Normal lung volumes\par  diffusion 72% predicted normal range\par Compared data dating back from April 2014 through May 2017 there is no decline of the flow rates although noted some decline in diffusion\par No patient is of short stature with increased weight of 225 pounds\par Also noted is a decline of the total lung capacity from 21.16 April 2017-4.80\par \par s chest x-ray PA lateral November 2020\par Borderline cardiomegaly\par Mild elevation right hemidiaphragm\par Grossly clear lung fields without parenchymal infiltrates pleural effusions or dominant pulmonary nodules\par Soft tissue bony structures grossly unremarkable\par Etelvina mediastinum demonstrates a mild calcification aortic knob\par \par Gross interval change compared to chest x-ray dating back to April 26, 2017\par \par Review of chest CT of May 8, 2017\par No pulmonary masses consolidation suspicious nodules\par Notation of the ascending thoracic aorta measured 4.3 cm\par No hiatal hernia\par Mild bronchiectasis right lower lobe\par Table linear atelectasis versus scar right lower lobe\par Mild\par Minor reticular opacities peripherally of lingula with mild atelectasis versus scarring\par Stable atelectasis right base\par Calcified granuloma right upper lobe\par Right upper lobe 2 mm noncalcified pulmonary nodule with benign\par \par Note this study was compared to study of September 13, 2016\par

## 2020-12-03 NOTE — HISTORY OF PRESENT ILLNESS
[Never] : never [TextBox_4] : Pending last visit was placed on Anoro with Singulair\par States that her wheezing is significantly improved less shortness of breath\par Still does have some residual cough\par No fevers chills or sweats\par No purulent sputum\par \par \par Patient seen as a new visit\par Did review old data that was available\par Chief complaint chest congestion intermittent daily cough\par Dominantly dry cough\par States supine wheezing\par Also states she feels short of breath with an occasional wheeze as well with laughing\par She is walking 4 miles a day but she says she only gets dyspnea with inclines\par Does not describe any significant purulent sputum hemoptysis\par No fevers chills or sweats\par Denies lower extremity edema\par No pleuritic chest pain or exertional chest pain reported\par As noted below prior data reviewed and has been treated in the past for asthmatic bronchitis\par Currently on no inhalers\par Non-smoker\par No history of chemical toxic inhalational exposures\par

## 2020-12-03 NOTE — DISCUSSION/SUMMARY
[FreeTextEntry1] : Mild airway disease chronic cough bronchospasm\par Noted decline at the total lung capacity and diffusion\par Based on a review of the prior CAT scan of the chest although no clinical signs of interstitial lung disease with crackles cannot rule out more subtle interstitial lung disease NSIP\par patient to bring the prior ? CT ABD vs chest for  review\par  treat inflammatory airway disease\par  PFT 1 month\par CT chest follow-up\par Singulair 1 tablet p.o. daily with food \par ANORO 1 puff QD\par ProAir rescue inhaler 2 puffs every 6 hours as needed with MDI instruction\par Notify of any wheezing.  Notify if rescue inhaler is needed greater than 2-3 times in any week.  Avoid known triggers.\par \par at f/u issue ICS  depending on bronchospasm\par

## 2021-01-07 ENCOUNTER — APPOINTMENT (OUTPATIENT)
Dept: PULMONOLOGY | Facility: CLINIC | Age: 71
End: 2021-01-07
Payer: MEDICARE

## 2021-01-07 VITALS
TEMPERATURE: 98.2 F | DIASTOLIC BLOOD PRESSURE: 85 MMHG | OXYGEN SATURATION: 95 % | HEART RATE: 75 BPM | SYSTOLIC BLOOD PRESSURE: 164 MMHG

## 2021-01-07 PROCEDURE — 99213 OFFICE O/P EST LOW 20 MIN: CPT

## 2021-01-07 NOTE — HISTORY OF PRESENT ILLNESS
[Never] : never [TextBox_4] :  on Anoro with Singulair\par States that her wheezing is significantly improved less shortness of breath\par Still does have some residual cough\par No fevers chills or sweats\par No purulent sputum\par \par \par Patient seen as a new visit\par Did review old data that was available\par Chief complaint chest congestion intermittent daily cough\par Dominantly dry cough\par States supine wheezing\par Also states she feels short of breath with an occasional wheeze as well with laughing\par She is walking 4 miles a day but she says she only gets dyspnea with inclines\par Does not describe any significant purulent sputum hemoptysis\par No fevers chills or sweats\par Denies lower extremity edema\par No pleuritic chest pain or exertional chest pain reported\par As noted below prior data reviewed and has been treated in the past for asthmatic bronchitis\par Currently on no inhalers\par Non-smoker\par No history of chemical toxic inhalational exposures\par

## 2021-01-07 NOTE — DISCUSSION/SUMMARY
[FreeTextEntry1] : Mild airway disease chronic cough bronchospasm\par Noted decline at the total lung capacity and diffusion\par Based on a review of the prior CAT scan of the chest although no clinical signs of interstitial lung disease with crackles cannot rule out more subtle interstitial lung disease NSIP- note CT March of AB 2020 - lower  chest negative\par  treat inflammatory airway disease\par  PFT 1 month\par CT chest follow-up\par Singulair 1 tablet p.o. daily with food \par ANORO 1 puff QD\par ProAir rescue inhaler 2 puffs every 6 hours as needed with MDI instruction\par Notify of any wheezing.  Notify if rescue inhaler is needed greater than 2-3 times in any week.  Avoid known triggers.\par \par at f/u issue ICS  depending on bronchospasm\par

## 2021-01-16 NOTE — DISCHARGE NOTE ADULT - INSTRUCTIONS
[Initial Evaluation] : an initial evaluation of [Coronary Artery Disease] : coronary artery disease [Hypertension] : hypertension Regular diet Notify MD if there is fever >101, chills, increase pain unrelieved by pain meds, nausea/vomiting or unable to tolerate regular diet.  Showering is allowed but do not submerge incision under water.  Drink 6-8 glasses of water.

## 2021-04-23 ENCOUNTER — APPOINTMENT (OUTPATIENT)
Dept: ENDOCRINOLOGY | Facility: CLINIC | Age: 71
End: 2021-04-23
Payer: MEDICARE

## 2021-04-23 VITALS — BODY MASS INDEX: 40.75 KG/M2 | HEIGHT: 63 IN | WEIGHT: 230 LBS

## 2021-04-23 DIAGNOSIS — E11.9 TYPE 2 DIABETES MELLITUS W/OUT COMPLICATIONS: ICD-10-CM

## 2021-04-23 PROCEDURE — G0108 DIAB MANAGE TRN  PER INDIV: CPT

## 2021-04-27 LAB — SARS-COV-2 N GENE NPH QL NAA+PROBE: NOT DETECTED

## 2021-04-29 ENCOUNTER — APPOINTMENT (OUTPATIENT)
Dept: PULMONOLOGY | Facility: CLINIC | Age: 71
End: 2021-04-29
Payer: MEDICARE

## 2021-04-29 VITALS
DIASTOLIC BLOOD PRESSURE: 77 MMHG | HEART RATE: 68 BPM | HEIGHT: 63 IN | OXYGEN SATURATION: 95 % | BODY MASS INDEX: 40.75 KG/M2 | WEIGHT: 230 LBS | RESPIRATION RATE: 16 BRPM | TEMPERATURE: 97.7 F | SYSTOLIC BLOOD PRESSURE: 169 MMHG

## 2021-04-29 DIAGNOSIS — R05 COUGH: ICD-10-CM

## 2021-04-29 DIAGNOSIS — I77.9 DISORDER OF ARTERIES AND ARTERIOLES, UNSPECIFIED: ICD-10-CM

## 2021-04-29 LAB — POCT - HEMOGLOBIN (HGB), QUANTITATIVE, TRANSCUTANEOUS: 13.9

## 2021-04-29 PROCEDURE — 99072 ADDL SUPL MATRL&STAF TM PHE: CPT

## 2021-04-29 PROCEDURE — 99214 OFFICE O/P EST MOD 30 MIN: CPT | Mod: 25

## 2021-04-29 PROCEDURE — ZZZZZ: CPT

## 2021-04-29 PROCEDURE — 99214 OFFICE O/P EST MOD 30 MIN: CPT | Mod: 25,CS

## 2021-04-29 PROCEDURE — 95012 NITRIC OXIDE EXP GAS DETER: CPT

## 2021-04-29 PROCEDURE — 94727 GAS DIL/WSHOT DETER LNG VOL: CPT

## 2021-04-29 PROCEDURE — 94729 DIFFUSING CAPACITY: CPT

## 2021-04-29 PROCEDURE — 88738 HGB QUANT TRANSCUTANEOUS: CPT

## 2021-04-29 PROCEDURE — 94010 BREATHING CAPACITY TEST: CPT

## 2021-04-29 RX ORDER — EVOLOCUMAB 140 MG/ML
140 INJECTION, SOLUTION SUBCUTANEOUS
Refills: 0 | Status: ACTIVE | COMMUNITY
Start: 2021-04-29

## 2021-04-29 NOTE — HISTORY OF PRESENT ILLNESS
[Never] : never [TextBox_4] : Post OP Right carotid endarterectomy [facility]\par  new Dx PRE DM\par \par  on Anoro with Singulair\par States that her wheezing is significantly improved less shortness of breath\par Still does have some residual cough\par No fevers chills or sweats\par No purulent sputum\par \par Patient seen as a new visit 11/5/21\par Did review old data that was available\par Chief complaint chest congestion intermittent daily cough\par Dominantly dry cough\par States supine wheezing\par Also states she feels short of breath with an occasional wheeze as well with laughing\par She is walking 4 miles a day but she says she only gets dyspnea with inclines\par Does not describe any significant purulent sputum hemoptysis\par No fevers chills or sweats\par Denies lower extremity edema\par No pleuritic chest pain or exertional chest pain reported\par As noted below prior data reviewed and has been treated in the past for asthmatic bronchitis\par Currently on no inhalers\par Non-smoker\par No history of chemical toxic inhalational exposures\par

## 2021-04-29 NOTE — PROCEDURE
[FreeTextEntry1] : PFT 4/29/21\par Nl Flow  Rtaes\par Nl Lung Volumes\par DLCO 79 % pred\par NIOX 18 ppb WNL  4/29/21\par \par Repeat PFT\par Box December 3, 2020\par Flow rates are normal\par FEV1 FVC ratio 78\par Normal lung volumes by box\par Specific inductance and resistance are normal\par Diffusion normal 84% predicted.\par NIOX 15 normal range December 3, 2020\par \par PFT no bronchodilator November 5, 2020\par Well-preserved normal flow rates\par Mild obstructive pattern\par FEV1 FVC ratio is very mildly reduced 74\par Normal lung volumes\par  diffusion 72% predicted normal range\par Compared data dating back from April 2014 through May 2017 there is no decline of the flow rates although noted some decline in diffusion\par No patient is of short stature with increased weight of 225 pounds\par Also noted is a decline of the total lung capacity from 21.16 April 2017-4.80\par \par  chest x-ray PA lateral November 2020\par Borderline cardiomegaly\par Mild elevation right hemidiaphragm\par Grossly clear lung fields without parenchymal infiltrates pleural effusions or dominant pulmonary nodules\par Soft tissue bony structures grossly unremarkable\par Etelvina mediastinum demonstrates a mild calcification aortic knob\par \par Gross interval change compared to chest x-ray dating back to April 26, 2017\par \par Review of chest CT of May 8, 2017\par No pulmonary masses consolidation suspicious nodules\par Notation of the ascending thoracic aorta measured 4.3 cm\par No hiatal hernia\par Mild bronchiectasis right lower lobe\par Table linear atelectasis versus scar right lower lobe\par Mild\par Minor reticular opacities peripherally of lingula with mild atelectasis versus scarring\par Stable atelectasis right base\par Calcified granuloma right upper lobe\par Right upper lobe 2 mm noncalcified pulmonary nodule with benign\par \par Note this study was compared to study of September 13, 2016\par

## 2021-04-29 NOTE — DISCUSSION/SUMMARY
[FreeTextEntry1] : Mild airway disease chronic cough bronchospasm\par Noted decline at the total lung capacity and diffusion\par Based on a review of the prior CAT scan of the chest although no clinical signs of interstitial lung disease with crackles cannot rule out more subtle interstitial lung disease NSIP- note CT March of AB 2020 - lower  chest negative\par CT chest follow-up\par Singulair 1 tablet p.o. daily with food \par ANORO 1 puff QD\par ProAir rescue inhaler 2 puffs every 6 hours as needed with MDI instruction\par Notify of any wheezing.  Notify if rescue inhaler is needed greater than 2-3 times in any week.  Avoid known triggers.\par \par at f/u issue ICS  depending on bronchospasm- HOLD\par PMD recheck BP\par

## 2021-05-24 ENCOUNTER — APPOINTMENT (OUTPATIENT)
Dept: ENDOCRINOLOGY | Facility: CLINIC | Age: 71
End: 2021-05-24

## 2021-06-07 ENCOUNTER — APPOINTMENT (OUTPATIENT)
Dept: PULMONOLOGY | Facility: CLINIC | Age: 71
End: 2021-06-07

## 2021-06-08 ENCOUNTER — RESULT REVIEW (OUTPATIENT)
Age: 71
End: 2021-06-08

## 2021-07-08 ENCOUNTER — APPOINTMENT (OUTPATIENT)
Dept: PULMONOLOGY | Facility: CLINIC | Age: 71
End: 2021-07-08

## 2021-09-07 PROBLEM — Z00.00 ENCOUNTER FOR PREVENTIVE HEALTH EXAMINATION: Noted: 2021-09-07

## 2021-09-16 ENCOUNTER — APPOINTMENT (OUTPATIENT)
Dept: ENDOCRINOLOGY | Facility: CLINIC | Age: 71
End: 2021-09-16

## 2021-10-12 ENCOUNTER — APPOINTMENT (OUTPATIENT)
Dept: MAMMOGRAPHY | Facility: IMAGING CENTER | Age: 71
End: 2021-10-12
Payer: MEDICARE

## 2021-10-12 ENCOUNTER — OUTPATIENT (OUTPATIENT)
Dept: OUTPATIENT SERVICES | Facility: HOSPITAL | Age: 71
LOS: 1 days | End: 2021-10-12
Payer: MEDICARE

## 2021-10-12 DIAGNOSIS — Z00.8 ENCOUNTER FOR OTHER GENERAL EXAMINATION: ICD-10-CM

## 2021-10-12 DIAGNOSIS — Z96.659 PRESENCE OF UNSPECIFIED ARTIFICIAL KNEE JOINT: Chronic | ICD-10-CM

## 2021-10-12 PROCEDURE — 77067 SCR MAMMO BI INCL CAD: CPT

## 2021-10-12 PROCEDURE — 77063 BREAST TOMOSYNTHESIS BI: CPT

## 2021-10-12 PROCEDURE — 77067 SCR MAMMO BI INCL CAD: CPT | Mod: 26

## 2021-10-12 PROCEDURE — 77063 BREAST TOMOSYNTHESIS BI: CPT | Mod: 26

## 2021-12-30 ENCOUNTER — NON-APPOINTMENT (OUTPATIENT)
Age: 71
End: 2021-12-30

## 2021-12-30 ENCOUNTER — APPOINTMENT (OUTPATIENT)
Dept: GASTROENTEROLOGY | Facility: CLINIC | Age: 71
End: 2021-12-30
Payer: MEDICARE

## 2021-12-30 VITALS
WEIGHT: 230 LBS | SYSTOLIC BLOOD PRESSURE: 140 MMHG | DIASTOLIC BLOOD PRESSURE: 100 MMHG | BODY MASS INDEX: 40.75 KG/M2 | HEART RATE: 74 BPM | HEIGHT: 63 IN | TEMPERATURE: 97.7 F | OXYGEN SATURATION: 6 %

## 2021-12-30 DIAGNOSIS — Z86.010 PERSONAL HISTORY OF COLONIC POLYPS: ICD-10-CM

## 2021-12-30 DIAGNOSIS — K57.90 DIVERTICULOSIS OF INTESTINE, PART UNSPECIFIED, W/OUT PERFORATION OR ABSCESS W/OUT BLEEDING: ICD-10-CM

## 2021-12-30 DIAGNOSIS — K59.09 OTHER CONSTIPATION: ICD-10-CM

## 2021-12-30 DIAGNOSIS — R12 HEARTBURN: ICD-10-CM

## 2021-12-30 PROCEDURE — 99204 OFFICE O/P NEW MOD 45 MIN: CPT

## 2021-12-30 RX ORDER — SODIUM SULFATE, POTASSIUM SULFATE, MAGNESIUM SULFATE 17.5; 3.13; 1.6 G/ML; G/ML; G/ML
17.5-3.13-1.6 SOLUTION, CONCENTRATE ORAL
Qty: 1 | Refills: 0 | Status: ACTIVE | COMMUNITY
Start: 2021-12-30 | End: 1900-01-01

## 2021-12-30 NOTE — ASSESSMENT
[FreeTextEntry1] : 71-year-old female resolved reflux\par History of colon polyps due for surveillance\par \par Plan\par Indications risks benefits and alternatives to colonoscopy reviewed patient agreeable to examination to be performed in the hospital endoscopy unit\par Given the mild chronic constipation, dose of magnesium citrate 2 nights prior to examination\par Cardiology clearance, upcoming office visit per patient\par

## 2021-12-30 NOTE — HISTORY OF PRESENT ILLNESS
[de-identified] : 71-year-old female\par History of reflux, now resolved, denies dysphagia, denies use of antacids\par Colonoscopy 5 years ago with Dr. Vilchis\par Here for evaluation prior to surveillance\par Mild chronic constipation without bleeding\par Patient on aspirin daily following her right carotid endarterectomy\par Denies any history of stroke, denies myocardial infarction dysrhythmia or congestive heart failure\par \par Social history: ,   of esophageal cancer, patient herself recently retired from [employer]

## 2021-12-30 NOTE — PHYSICAL EXAM
[General Appearance - Alert] : alert [General Appearance - In No Acute Distress] : in no acute distress [Sclera] : the sclera and conjunctiva were normal [PERRL With Normal Accommodation] : pupils were equal in size, round, and reactive to light [Extraocular Movements] : extraocular movements were intact [Outer Ear] : the ears and nose were normal in appearance [Oropharynx] : the oropharynx was normal [Auscultation Breath Sounds / Voice Sounds] : lungs were clear to auscultation bilaterally [Heart Rate And Rhythm] : heart rate was normal and rhythm regular [Heart Sounds] : normal S1 and S2 [Heart Sounds Gallop] : no gallops [Murmurs] : no murmurs [Heart Sounds Pericardial Friction Rub] : no pericardial rub [Edema] : there was no peripheral edema [Bowel Sounds] : normal bowel sounds [Abdomen Soft] : soft [Abdomen Tenderness] : non-tender [Abdomen Mass (___ Cm)] : no abdominal mass palpated [FreeTextEntry1] : Scar [Cervical Lymph Nodes Enlarged Posterior Bilaterally] : posterior cervical [Cervical Lymph Nodes Enlarged Anterior Bilaterally] : anterior cervical [Supraclavicular Lymph Nodes Enlarged Bilaterally] : supraclavicular [Axillary Lymph Nodes Enlarged Bilaterally] : axillary [Femoral Lymph Nodes Enlarged Bilaterally] : femoral [Inguinal Lymph Nodes Enlarged Bilaterally] : inguinal [No CVA Tenderness] : no ~M costovertebral angle tenderness [No Spinal Tenderness] : no spinal tenderness [Abnormal Walk] : normal gait [Nail Clubbing] : no clubbing  or cyanosis of the fingernails [Musculoskeletal - Swelling] : no joint swelling seen [Motor Tone] : muscle strength and tone were normal [Skin Color & Pigmentation] : normal skin color and pigmentation [Skin Turgor] : normal skin turgor [] : no rash [Oriented To Time, Place, And Person] : oriented to person, place, and time [Impaired Insight] : insight and judgment were intact [Affect] : the affect was normal

## 2022-01-06 ENCOUNTER — EMERGENCY (EMERGENCY)
Facility: HOSPITAL | Age: 72
LOS: 1 days | Discharge: HOME | End: 2022-01-06
Attending: EMERGENCY MEDICINE
Payer: MEDICARE

## 2022-01-06 VITALS
DIASTOLIC BLOOD PRESSURE: 110 MMHG | RESPIRATION RATE: 18 BRPM | SYSTOLIC BLOOD PRESSURE: 180 MMHG | WEIGHT: 229.94 LBS | HEART RATE: 82 BPM | OXYGEN SATURATION: 95 % | TEMPERATURE: 98 F | HEIGHT: 63 IN

## 2022-01-06 VITALS — SYSTOLIC BLOOD PRESSURE: 145 MMHG | DIASTOLIC BLOOD PRESSURE: 89 MMHG | TEMPERATURE: 99 F

## 2022-01-06 DIAGNOSIS — Z96.659 PRESENCE OF UNSPECIFIED ARTIFICIAL KNEE JOINT: Chronic | ICD-10-CM

## 2022-01-06 PROCEDURE — 99284 EMERGENCY DEPT VISIT MOD MDM: CPT | Mod: CS

## 2022-01-06 PROCEDURE — 99282 EMERGENCY DEPT VISIT SF MDM: CPT

## 2022-01-06 NOTE — ED PROVIDER NOTE - OBJECTIVE STATEMENT
72 y/o female PMHx endometrial cancer s/p total radical abdominal hysterectomy, HTN, HLD non smoker, obese, COVID + 4 days ago now presenting to the ED with left ear pain and shortness of breath. Patient reported fevers but has been afebrile for 48 hours. Cough non productive and has SALAZAR. Patient reported left ear pain with some improvement using hydrogen peroxide. Patient eating and drinking. Patient denied CP, abdominal pain, N/V/D,

## 2022-01-06 NOTE — ED PROVIDER NOTE - NSICDXPASTMEDICALHX_GEN_ALL_CORE_FT
PAST MEDICAL HISTORY:  Arthritis hx bilateral knee replacements    Dysphagia occasional with solids, denies any choking, saw gastroenterologist (Dr. Lila Vilchis) recently    Endometrial cancer     HLD (hyperlipidemia)     HTN (hypertension)     HTN (hypertension)     Morbid obesity     Shingles outbreak (2015)    UTI (urinary tract infection) Recurrent (last infection 1 month ago)

## 2022-01-06 NOTE — ED PROVIDER NOTE - NSFOLLOWUPINSTRUCTIONS_ED_ALL_ED_FT
Follow up with your Primary Care Physician within the next 2-3 days  Bring a copy of your test results with you to your appointment  Continue your current medication regimen  Return to the Emergency Room if you experience new or worsening symptoms abdominal pain, nausea, vomiting, fever chills, cough, chest pain, shortness of breath, dizziness, slurred speech, weakness, gait abnormality     REMAIN WELL HYDRATED AND AVOID SKIPPING MEALS  TAKE TYLENOL 1000MG EVERY 6 HOURS AS NEEDED FOR FEVER AND BODY ACHES  REMAIN QUARANTINED AS PER CDC RECOMMENDATIONS   RETURN TO THE ED IF YOU HAVE DIFFICULTY SPEAKING DUE TO SHORTNESS OF BREATH OR IF YOUR HOME PULSE OXIMETER IS LESS THAN 90%

## 2022-01-06 NOTE — ED PROVIDER NOTE - PATIENT PORTAL LINK FT
You can access the FollowMyHealth Patient Portal offered by [facility] by registering at the following website: [email or web address]. By joining [facility]’s FollowMyHealth portal, you will also be able to view your health information using other applications (apps) compatible with our system.

## 2022-01-06 NOTE — ED ADULT NURSE NOTE - OBJECTIVE STATEMENT
Pt complaining of left ear pain and shortness of breath, cough, fever.  Pt became symptomatic on Monday, fever has resolved, had telemedicine visit with her doctor today who advised her to come to ER.  Denies chest pain, syncope, abdominal pain, nausea/vomiting/diarrhea.  Ambulating well spo2 95%.

## 2022-01-06 NOTE — ED PROVIDER NOTE - NSICDXFAMILYHX_GEN_ALL_CORE_FT
FAMILY HISTORY:  Family history of abdominal aortic aneurysm (AAA)  Family history of COPD (chronic obstructive pulmonary disease)  Family history of hypertension    Sibling  Still living? Unknown  Family history of diabetes mellitus, Age at diagnosis: Age Unknown    Grandparent  Still living? Unknown  Family history of coronary artery disease, Age at diagnosis: Age Unknown    Aunt  Still living? Unknown  Family history of breast cancer, Age at diagnosis: Age Unknown  Family history of diabetes mellitus, Age at diagnosis: Age Unknown

## 2022-01-06 NOTE — ED PROVIDER NOTE - NSICDXPASTSURGICALHX_GEN_ALL_CORE_FT
PAST SURGICAL HISTORY:  History of total knee arthroplasty bilateral knee replacements Right- 2003, Left- 2009

## 2022-01-06 NOTE — ED PROVIDER NOTE - ATTENDING CONTRIBUTION TO CARE
attending Suzette: 71yF h/o endometrial cancer s/p total radical abdominal hysterectomy, HTN, HLD non smoker, obese, COVID + 4 days ago p/w SOB, L ear pain. Was concerned for hypoxia on home pulse oximeter but not hypoxic at rest or with ambulation in ED. Well appearing with grossly clear lungs, TMs normal b/l. Reports having steroid inhaler at home. Will dc with instruction to use inhaler, close PMD follow-up and strict return precautions

## 2022-01-07 ENCOUNTER — APPOINTMENT (OUTPATIENT)
Dept: PULMONOLOGY | Facility: CLINIC | Age: 72
End: 2022-01-07
Payer: MEDICARE

## 2022-01-07 ENCOUNTER — NON-APPOINTMENT (OUTPATIENT)
Age: 72
End: 2022-01-07

## 2022-01-07 PROCEDURE — 99214 OFFICE O/P EST MOD 30 MIN: CPT | Mod: CS,95

## 2022-01-07 RX ORDER — AMOXICILLIN 500 MG/1
500 TABLET, FILM COATED ORAL
Qty: 12 | Refills: 3 | Status: DISCONTINUED | COMMUNITY
Start: 2021-02-23 | End: 2022-01-07

## 2022-01-07 NOTE — DISCUSSION/SUMMARY
[FreeTextEntry1] : 19 infection greater than 1 week of symptomatology\par At the present time there is no indication for monoclonal antibody infusion\par Need to monitor O2 saturations rest hydration\par Office follow-up 2 weeks\par Chest x-ray\par Schedule PFT\par Patient is already on aspirin protocol and vitamin D\par Notify office of any change in status prior to next visit\par \par Mild airway disease chronic cough bronchospasm\par Noted decline at the total lung capacity and diffusion\par Based on a review of the prior CAT scan of the chest although no clinical signs of interstitial lung disease with crackles cannot rule out more subtle interstitial lung disease NSIP- note CT March of AB 2020 - lower  chest negative\par CT chest follow-up adressed if any  indication\par Singulair 1 tablet p.o. daily with food OFF\par ANORO 1 puff QD OFF\par ProAir rescue inhaler 2 puffs every 6 hours as needed with MDI instruction\par Notify of any wheezing.  Notify if rescue inhaler is needed greater than 2-3 times in any week.  Avoid known triggers.\par \par at f/u issue ICS  depending on bronchospasm- HOLD\par PMD recheck BP\par

## 2022-01-07 NOTE — PHYSICAL EXAM
[No Acute Distress] : no acute distress [No Resp Distress] : no resp distress [TextBox_68] : Able to speak without coughing or identifiable shortness of breath by voice

## 2022-01-07 NOTE — PROCEDURE
[FreeTextEntry1] : PFT 4/29/21\par Nl Flow  Rates \par Nl Lung Volumes\par DLCO 79 % pred\par NIOX 18 ppb WNL  4/29/21\par \par Repeat PFT\par Box December 3, 2020\par Flow rates are normal\par FEV1 FVC ratio 78\par Normal lung volumes by box\par Specific inductance and resistance are normal\par Diffusion normal 84% predicted.\par NIOX 15 normal range December 3, 2020\par \par PFT no bronchodilator November 5, 2020\par Well-preserved normal flow rates\par Mild obstructive pattern\par FEV1 FVC ratio is very mildly reduced 74\par Normal lung volumes\par  diffusion 72% predicted normal range\par Compared data dating back from April 2014 through May 2017 there is no decline of the flow rates although noted some decline in diffusion\par No patient is of short stature with increased weight of 225 pounds\par Also noted is a decline of the total lung capacity from 21.16 April 2017-4.80\par \par  chest x-ray PA lateral November 2020\par Borderline cardiomegaly\par Mild elevation right hemidiaphragm\par Grossly clear lung fields without parenchymal infiltrates pleural effusions or dominant pulmonary nodules\par Soft tissue bony structures grossly unremarkable\par Etelvina mediastinum demonstrates a mild calcification aortic knob\par \par Gross interval change compared to chest x-ray dating back to April 26, 2017\par \par Review of chest CT of May 8, 2017\par No pulmonary masses consolidation suspicious nodules\par Notation of the ascending thoracic aorta measured 4.3 cm\par No hiatal hernia\par Mild bronchiectasis right lower lobe\par Table linear atelectasis versus scar right lower lobe\par Mild\par Minor reticular opacities peripherally of lingula with mild atelectasis versus scarring\par Stable atelectasis right base\par Calcified granuloma right upper lobe\par Right upper lobe 2 mm noncalcified pulmonary nodule with benign\par \par Note this study was compared to study of September 13, 2016\par

## 2022-01-07 NOTE — HISTORY OF PRESENT ILLNESS
[Home] : at home, [unfilled] , at the time of the visit. [Medical Office: ([facility])___] : at the medical office located in  [Verbal consent obtained from patient] : the patient, [unfilled] [Never] : never [TextBox_4] : Symptoms just greater than 1 week ago positive COVID-19 infection\par Symptoms last Sunday\par Headache nausea abdominal discomfort chest congestion felt like she needs to only be in bed\par Fatigue fever\par Most of the symptoms have resolved she does have residual chest congestion\par She was short of breath and was not seen at the [facility] emergency department January 6\par They did not do blood work or x-ray\par They did walker noted O2 saturation 94% and discharged home\par She does have some nocturnal wheeze that responds to her rescue inhaler\par Currently is not on prior prescribed ANORO ELLIPTA with singular\par January 2 noted that she did home rapid antigen which was positive for COVID-19\par She is completed the 2 dose protocol for Covid positive but is pending booster\par Past medical history coronary artery disease obstructive airway disease diabetes

## 2022-01-24 DIAGNOSIS — Z01.812 ENCOUNTER FOR PREPROCEDURAL LABORATORY EXAMINATION: ICD-10-CM

## 2022-01-31 ENCOUNTER — APPOINTMENT (OUTPATIENT)
Dept: PULMONOLOGY | Facility: CLINIC | Age: 72
End: 2022-01-31

## 2022-01-31 LAB — SARS-COV-2 N GENE NPH QL NAA+PROBE: DETECTED

## 2022-02-23 ENCOUNTER — NON-APPOINTMENT (OUTPATIENT)
Age: 72
End: 2022-02-23

## 2022-03-07 ENCOUNTER — APPOINTMENT (OUTPATIENT)
Dept: PULMONOLOGY | Facility: CLINIC | Age: 72
End: 2022-03-07
Payer: MEDICARE

## 2022-03-07 VITALS — SYSTOLIC BLOOD PRESSURE: 132 MMHG | HEART RATE: 83 BPM | DIASTOLIC BLOOD PRESSURE: 85 MMHG | OXYGEN SATURATION: 94 %

## 2022-03-07 LAB — POCT - HEMOGLOBIN (HGB), QUANTITATIVE, TRANSCUTANEOUS: 12.7

## 2022-03-07 PROCEDURE — 88738 HGB QUANT TRANSCUTANEOUS: CPT

## 2022-03-07 PROCEDURE — 99214 OFFICE O/P EST MOD 30 MIN: CPT | Mod: CS,25

## 2022-03-07 PROCEDURE — 94010 BREATHING CAPACITY TEST: CPT

## 2022-03-07 PROCEDURE — 94727 GAS DIL/WSHOT DETER LNG VOL: CPT

## 2022-03-07 PROCEDURE — ZZZZZ: CPT

## 2022-03-07 PROCEDURE — 71046 X-RAY EXAM CHEST 2 VIEWS: CPT

## 2022-03-07 PROCEDURE — 94729 DIFFUSING CAPACITY: CPT

## 2022-03-07 NOTE — PROCEDURE
[FreeTextEntry1] : PFT 3/7/22\par  flow rates nl\par Lung Volumes nl\par  DLCO  82 %\par HGB 12.7\par \par Chest x-ray PA lateral 3/7/22\par Mild elevation right hemidiaphragm with mild volume loss right lung\par Kyphosis\par Uncoiled aorta\par Clear lungs without parenchymal infiltrates pleural effusions with dominant pulmonary nodules\par Minimal calcification aortic knob\par No interval change compared to chest x-ray of November 5, 2020\par \par \par PFT 4/29/21\par Nl Flow  Rates \par Nl Lung Volumes\par DLCO 79 % pred\par NIOX 18 ppb WNL  4/29/21\par \par Repeat PFT\par Box December 3, 2020\par Flow rates are normal\par FEV1 FVC ratio 78\par Normal lung volumes by box\par Specific inductance and resistance are normal\par Diffusion normal 84% predicted.\par NIOX 15 normal range December 3, 2020\par \par PFT no bronchodilator November 5, 2020\par Well-preserved normal flow rates\par Mild obstructive pattern\par FEV1 FVC ratio is very mildly reduced 74\par Normal lung volumes\par  diffusion 72% predicted normal range\par Compared data dating back from April 2014 through May 2017 there is no decline of the flow rates although noted some decline in diffusion\par No patient is of short stature with increased weight of 225 pounds\par Also noted is a decline of the total lung capacity from 21.16 April 2017-4.80\par \par  chest x-ray PA lateral November 2020\par Borderline cardiomegaly\par Mild elevation right hemidiaphragm\par Grossly clear lung fields without parenchymal infiltrates pleural effusions or dominant pulmonary nodules\par Soft tissue bony structures grossly unremarkable\par Etelvina mediastinum demonstrates a mild calcification aortic knob\par \par Gross interval change compared to chest x-ray dating back to April 26, 2017\par \par Review of chest CT of May 8, 2017\par No pulmonary masses consolidation suspicious nodules\par Notation of the ascending thoracic aorta measured 4.3 cm\par No hiatal hernia\par Mild bronchiectasis right lower lobe\par Table linear atelectasis versus scar right lower lobe\par Mild\par Minor reticular opacities peripherally of lingula with mild atelectasis versus scarring\par Stable atelectasis right base\par Calcified granuloma right upper lobe\par Right upper lobe 2 mm noncalcified pulmonary nodule with benign\par \par Note this study was compared to study of September 13, 2016\par

## 2022-03-07 NOTE — HISTORY OF PRESENT ILLNESS
[TextBox_4] : January 7, 2022 pos COVID\par post pos recovery better occ nocturnal wheeze\par Symptoms just greater than 1 week ago positive COVID-19 infection\par Symptoms last Sunday\par Headache nausea abdominal discomfort chest congestion felt like she needs to only be in bed\par Fatigue fever\par Most of the symptoms have resolved she does have residual chest congestion\par She was short of breath and was not seen at the [facility] emergency department January 6\par They did not do blood work or x-ray\par They did walker noted O2 saturation 94% and discharged home\par She does have some nocturnal wheeze that responds to her rescue inhaler\par Currently is not on prior prescribed ANORO ELLIPTA with singular\par January 2 noted that she did home rapid antigen which was positive for COVID-19\par She is completed the 2 dose protocol for Covid positive but is pending booster\par Past medical history coronary artery disease obstructive airway disease diabetes. \par Smoking Status: never \par \par addednum HLD per card- Katerin

## 2022-03-07 NOTE — DISCUSSION/SUMMARY
[FreeTextEntry1] : COVID 19 infection Jan 7 2022\par did not receive monoclonal antibody infusion\par Chest x-ray completed\par Schedule PFT completed\par Patient is already on aspirin protocol and vitamin D\par Notify office of any change in status prior to next visit\par \par Mild airway disease chronic cough bronchospasm\par Noted decline at the total lung capacity and diffusion\par Based on a review of the prior CAT scan of the chest although no clinical signs of interstitial lung disease with crackles cannot rule out more subtle interstitial lung disease NSIP- note CT March of AB 2020 - lower  chest negative\par CT chest follow-up adressed if any  indication for change in respiratory status\par Singulair 1 tablet p.o. daily with food OFF\par ANORO 1 puff QD OFF\par ProAir rescue inhaler 2 puffs every 6 hours as needed with MDI instruction\par Notify of any wheezing.  Notify if rescue inhaler is needed greater than 2-3 times in any week.  Avoid known triggers.\par \par at f/u issue ICS  depending on bronchospasm- HOLD\par Advised to get COVID booster PFIZER \par

## 2022-04-19 ENCOUNTER — NON-APPOINTMENT (OUTPATIENT)
Age: 72
End: 2022-04-19

## 2022-04-25 ENCOUNTER — APPOINTMENT (OUTPATIENT)
Dept: GASTROENTEROLOGY | Facility: HOSPITAL | Age: 72
End: 2022-04-25

## 2022-06-15 ENCOUNTER — APPOINTMENT (OUTPATIENT)
Dept: MRI IMAGING | Facility: CLINIC | Age: 72
End: 2022-06-15
Payer: MEDICARE

## 2022-06-15 ENCOUNTER — OUTPATIENT (OUTPATIENT)
Dept: OUTPATIENT SERVICES | Facility: HOSPITAL | Age: 72
LOS: 1 days | End: 2022-06-15
Payer: MEDICARE

## 2022-06-15 DIAGNOSIS — Z00.8 ENCOUNTER FOR OTHER GENERAL EXAMINATION: ICD-10-CM

## 2022-06-15 DIAGNOSIS — Z96.659 PRESENCE OF UNSPECIFIED ARTIFICIAL KNEE JOINT: Chronic | ICD-10-CM

## 2022-06-15 PROCEDURE — 73721 MRI JNT OF LWR EXTRE W/O DYE: CPT | Mod: 26,RT,MH

## 2022-06-15 PROCEDURE — 73721 MRI JNT OF LWR EXTRE W/O DYE: CPT | Mod: MH

## 2022-06-17 ENCOUNTER — LABORATORY RESULT (OUTPATIENT)
Age: 72
End: 2022-06-17

## 2022-06-20 ENCOUNTER — OUTPATIENT (OUTPATIENT)
Dept: OUTPATIENT SERVICES | Facility: HOSPITAL | Age: 72
LOS: 1 days | End: 2022-06-20
Payer: MEDICARE

## 2022-06-20 ENCOUNTER — APPOINTMENT (OUTPATIENT)
Dept: GASTROENTEROLOGY | Facility: HOSPITAL | Age: 72
End: 2022-06-20

## 2022-06-20 ENCOUNTER — RESULT REVIEW (OUTPATIENT)
Age: 72
End: 2022-06-20

## 2022-06-20 ENCOUNTER — TRANSCRIPTION ENCOUNTER (OUTPATIENT)
Age: 72
End: 2022-06-20

## 2022-06-20 VITALS
WEIGHT: 229.94 LBS | HEART RATE: 80 BPM | RESPIRATION RATE: 18 BRPM | TEMPERATURE: 98 F | DIASTOLIC BLOOD PRESSURE: 75 MMHG | OXYGEN SATURATION: 96 % | SYSTOLIC BLOOD PRESSURE: 164 MMHG | HEIGHT: 63 IN

## 2022-06-20 VITALS
OXYGEN SATURATION: 96 % | DIASTOLIC BLOOD PRESSURE: 80 MMHG | RESPIRATION RATE: 19 BRPM | SYSTOLIC BLOOD PRESSURE: 135 MMHG | HEART RATE: 68 BPM

## 2022-06-20 DIAGNOSIS — Z96.659 PRESENCE OF UNSPECIFIED ARTIFICIAL KNEE JOINT: Chronic | ICD-10-CM

## 2022-06-20 DIAGNOSIS — Z86.010 PERSONAL HISTORY OF COLONIC POLYPS: ICD-10-CM

## 2022-06-20 PROCEDURE — 45385 COLONOSCOPY W/LESION REMOVAL: CPT

## 2022-06-20 PROCEDURE — C1889: CPT

## 2022-06-20 PROCEDURE — 45385 COLONOSCOPY W/LESION REMOVAL: CPT | Mod: PT

## 2022-06-20 PROCEDURE — 88305 TISSUE EXAM BY PATHOLOGIST: CPT

## 2022-06-20 PROCEDURE — 88305 TISSUE EXAM BY PATHOLOGIST: CPT | Mod: 26

## 2022-06-20 DEVICE — NET RETRV ROT ROTH 2.5MMX230CM: Type: IMPLANTABLE DEVICE | Status: FUNCTIONAL

## 2022-06-20 DEVICE — CLIP RESOLUTION 360 235CM: Type: IMPLANTABLE DEVICE | Status: FUNCTIONAL

## 2022-06-20 RX ORDER — OLMESARTAN MEDOXOMIL 5 MG/1
1 TABLET, FILM COATED ORAL
Qty: 0 | Refills: 0 | DISCHARGE

## 2022-06-20 RX ORDER — SODIUM CHLORIDE 9 MG/ML
500 INJECTION INTRAMUSCULAR; INTRAVENOUS; SUBCUTANEOUS
Refills: 0 | Status: DISCONTINUED | OUTPATIENT
Start: 2022-06-20 | End: 2022-07-04

## 2022-06-20 RX ORDER — UBIDECARENONE 100 MG
1 CAPSULE ORAL
Qty: 0 | Refills: 0 | DISCHARGE

## 2022-06-20 RX ORDER — ASPIRIN/CALCIUM CARB/MAGNESIUM 324 MG
1 TABLET ORAL
Qty: 0 | Refills: 0 | DISCHARGE

## 2022-06-20 RX ORDER — AMLODIPINE BESYLATE 2.5 MG/1
1 TABLET ORAL
Qty: 0 | Refills: 0 | DISCHARGE

## 2022-06-20 RX ORDER — ACETAMINOPHEN 500 MG
2 TABLET ORAL
Qty: 0 | Refills: 0 | DISCHARGE

## 2022-06-20 RX ORDER — METHENAMINE MANDELATE 1 G
1 TABLET ORAL
Qty: 0 | Refills: 0 | DISCHARGE

## 2022-06-20 RX ORDER — LUTEIN 20 MG
1 CAPSULE ORAL
Qty: 0 | Refills: 0 | DISCHARGE

## 2022-06-20 RX ORDER — IBUPROFEN 200 MG
1 TABLET ORAL
Qty: 0 | Refills: 0 | DISCHARGE

## 2022-06-20 NOTE — ASU DISCHARGE PLAN (ADULT/PEDIATRIC) - NS MD DC FALL RISK RISK
For information on Fall & Injury Prevention, visit: [email or web address] OR  [email or web address] OR  https://www.cdc.gov/steadi/patient.html

## 2022-06-20 NOTE — PRE PROCEDURE NOTE - PRE PROCEDURE EVALUATION
Attending Physician:                           Procedure:   Colonoscopy     Indication for Procedure:   Screening  ________________________________________________________  PAST MEDICAL & SURGICAL HISTORY:  Morbid obesity      HTN (hypertension)      Arthritis  hx bilateral knee replacements      UTI (urinary tract infection)  Recurrent (last infection 1 month ago)      HLD (hyperlipidemia)      Shingles outbreak  (2015)      HTN (hypertension)      Endometrial cancer      Dysphagia  occasional with solids, denies any choking, saw gastroenterologist (Dr. Lila Vilchis) recently      History of total knee arthroplasty  bilateral knee replacements Right- 2003, Left- 2009        ALLERGIES:  Bactrim (Rash (Severe); Fever)  latex (Urticaria)  morphine (Pruritus; Rash; Hives)  sulfa drugs (Rash; Urticaria; Hives)    HOME MEDICATIONS:  aspirin 81 mg oral tablet: 1 tab(s) orally once a day  CoQ10 300 mg oral capsule: 1 cap(s) orally once a day  hydroCHLOROthiazide 25 mg oral tablet: 1 tab(s) orally once a day  lutein 20 mg oral tablet: 1 tab(s) orally once a day. last dose 8/15/2018  methenamine hippurate 1 g oral tablet: 1 tab(s) orally 2 times a day  olmesartan 40 mg oral tablet: 1 tab(s) orally once a day  Vitamin B: 1 tab(s) orally once a day    AICD/PPM: [ ] yes   [ X ] no    PERTINENT LAB DATA:                      PHYSICAL EXAMINATION:    T(C): --  HR: --  BP: --  RR: --  SpO2: --    Constitutional: NAD    Neck:  No JVD  Respiratory: CTAB/L  Cardiovascular: S1 and S2  Gastrointestinal: BS+, soft, NT/ND  Extremities: No peripheral edema  Neurological: A/O x 3, no focal deficits        COMMENTS:    The patient is a suitable candidate for the planned procedure unless box checked [ ]  No, explain:    
Attending Physician:        Hardeep Rodriguez MD                    Procedure:    Indication for Procedure: screening  ________________________________________________________  PAST MEDICAL & SURGICAL HISTORY:  Morbid obesity      HTN (hypertension)      Arthritis  hx bilateral knee replacements      UTI (urinary tract infection)  Recurrent (last infection 1 month ago)      HLD (hyperlipidemia)      Shingles outbreak  (2015)      HTN (hypertension)      Endometrial cancer      Dysphagia  occasional with solids, denies any choking, saw gastroenterologist (Dr. Lila Vilchis) recently      History of total knee arthroplasty  bilateral knee replacements Right- 2003, Left- 2009        ALLERGIES:  Bactrim (Rash (Severe); Fever)  latex (Urticaria)  morphine (Pruritus; Rash; Hives)  sulfa drugs (Rash; Urticaria; Hives)    HOME MEDICATIONS:  amLODIPine 5 mg oral tablet: 1 tab(s) orally once a day  aspirin 81 mg oral tablet: 1 tab(s) orally once a day  CoQ10 300 mg oral capsule: 1 cap(s) orally once a day  hydroCHLOROthiazide 25 mg oral tablet: 1 tab(s) orally once a day  lutein 20 mg oral tablet: 1 tab(s) orally once a day. last dose 8/15/2018  Motrin 600 mg oral tablet: 1 tab(s) orally every 6 hours, As Needed  Tylenol 325 mg oral capsule: 2 tab(s) orally every 6 hours, As Needed  Vitamin B: 1 tab(s) orally once a day    AICD/PPM: [ ] yes   [ x] no    PERTINENT LAB DATA:                      PHYSICAL EXAMINATION:    T(C): --  HR: --  BP: --  RR: --  SpO2: --    Constitutional: NAD  HEENT: PERRLA, EOMI,    Neck:  No JVD  Respiratory: CTAB/L  Cardiovascular: S1 and S2  Gastrointestinal: BS+, soft, NT/ND  Extremities: No peripheral edema  Neurological: A/O x 3, no focal deficits  Psychiatric: Normal mood, normal affect  Skin: No rashes    ASA Class: I [ ]  II [x ]  III [ ]  IV [ ]    COMMENTS:    The patient is a suitable candidate for the planned procedure unless box checked [ ]  No, explain:

## 2022-06-21 ENCOUNTER — NON-APPOINTMENT (OUTPATIENT)
Age: 72
End: 2022-06-21

## 2022-06-21 LAB — SURGICAL PATHOLOGY STUDY: SIGNIFICANT CHANGE UP

## 2022-07-06 ENCOUNTER — RESULT REVIEW (OUTPATIENT)
Age: 72
End: 2022-07-06

## 2022-07-06 ENCOUNTER — APPOINTMENT (OUTPATIENT)
Dept: OBGYN | Facility: CLINIC | Age: 72
End: 2022-07-06

## 2022-07-06 PROCEDURE — 99213 OFFICE O/P EST LOW 20 MIN: CPT | Mod: 25

## 2022-07-06 PROCEDURE — 81002 URINALYSIS NONAUTO W/O SCOPE: CPT

## 2022-07-06 PROCEDURE — G0328 FECAL BLOOD SCRN IMMUNOASSAY: CPT | Mod: QW

## 2022-07-27 NOTE — ASU PREOP CHECKLIST - BLOOD AVAILABLE
39w0d without major complaints.   Labor precautions reviewed.   IOL requested for 41 weeks.   RTC in 1 week for routine PNC.    T&S to be sent/n/a T&S sent/n/a

## 2022-08-31 ENCOUNTER — APPOINTMENT (OUTPATIENT)
Dept: NEUROLOGY | Facility: CLINIC | Age: 72
End: 2022-08-31

## 2022-09-12 ENCOUNTER — APPOINTMENT (OUTPATIENT)
Dept: PULMONOLOGY | Facility: CLINIC | Age: 72
End: 2022-09-12

## 2022-09-12 VITALS
HEART RATE: 72 BPM | OXYGEN SATURATION: 94 % | SYSTOLIC BLOOD PRESSURE: 167 MMHG | BODY MASS INDEX: 39.51 KG/M2 | TEMPERATURE: 98.3 F | RESPIRATION RATE: 16 BRPM | DIASTOLIC BLOOD PRESSURE: 85 MMHG | HEIGHT: 63 IN | WEIGHT: 223 LBS

## 2022-09-12 DIAGNOSIS — R06.09 OTHER FORMS OF DYSPNEA: ICD-10-CM

## 2022-09-12 DIAGNOSIS — U07.1 COVID-19: ICD-10-CM

## 2022-09-12 LAB — POCT - HEMOGLOBIN (HGB), QUANTITATIVE, TRANSCUTANEOUS: 13.8

## 2022-09-12 PROCEDURE — 94729 DIFFUSING CAPACITY: CPT

## 2022-09-12 PROCEDURE — 94727 GAS DIL/WSHOT DETER LNG VOL: CPT

## 2022-09-12 PROCEDURE — ZZZZZ: CPT

## 2022-09-12 PROCEDURE — 99214 OFFICE O/P EST MOD 30 MIN: CPT | Mod: CS,25

## 2022-09-12 PROCEDURE — 94010 BREATHING CAPACITY TEST: CPT

## 2022-09-12 PROCEDURE — 88738 HGB QUANT TRANSCUTANEOUS: CPT

## 2022-09-12 NOTE — DISCUSSION/SUMMARY
[FreeTextEntry1] : Overall stable reps ststus with stable pulmonary physiology\par \par COVID 19 infection Jan 7 2022\par did not receive monoclonal antibody infusion\par Chest x-ray completed\par Schedule PFT completed\par Patient is already on aspirin protocol and vitamin D\par Notify office of any change in status prior to next visit\par Based on a review of the prior CAT scan of the chest although no clinical signs of interstitial lung disease with crackles cannot rule out more subtle interstitial lung disease NSIP- note CT March of AB 2020 - lower  chest negative\par CT chest follow-up adressed if any  indication for change in respiratory status\par Singulair 1 tablet p.o. daily with food OFF\par ANORO 1 puff QD OFF\par ProAir rescue inhaler 2 puffs every 6 hours as needed with MDI instruction\par Notify of any wheezing.  Notify if rescue inhaler is needed greater than 2-3 times in any week.  Avoid known triggers.\par \par at f/u issue ICS  depending on bronchospasm- HOLD\par Advised to get COVID booster PFIZER  with variants\par f/u 6 months\par addressed Flu vaccine and ?? dose PCV 23  to complete pneumonia vaccine protocol \par

## 2022-09-12 NOTE — HISTORY OF PRESENT ILLNESS
[TextBox_4] : January 7, 2022 pos COVID\par Denies  any active Resp sxs\par pending ? R ankle reconstruction\par post pos recovery better occ nocturnal wheeze - very little\par SXS with COVID\par Headache nausea abdominal discomfort chest congestion felt like she needs to only be in bed\par Fatigue fever\par Most of the symptoms have resolved she does have residual chest congestion\par She was short of breath and was not seen at the [facility] emergency department January 6\par They did not do blood work or x-ray\par They did walker noted O2 saturation 94% and discharged home\par She does have some nocturnal wheeze that responds to her rescue inhaler\par Currently is not on prior prescribed ANORO ELLIPTA with singular\par January 2 noted that she did home rapid antigen which was positive for COVID-19\par She is completed the 2 dose protocol for Covid positive but is pending booster\par Past medical history coronary artery disease obstructive airway disease diabetes. \par Smoking Status: never \par \par addednum HLD per card- Katerin

## 2022-09-12 NOTE — PROCEDURE
[FreeTextEntry1] : PFT 9/12/22\par well preserved Flow rates\par  very mild OAD\par  lung volumes nl\par pos  airtrapping\par  DLCO  89 % WNL\par HGB 13.8\par \par PFT 3/7/22\par  flow rates nl\par Lung Volumes nl\par  DLCO  82 %\par HGB 12.7\par \par Chest x-ray PA lateral 3/7/22\par Mild elevation right hemidiaphragm with mild volume loss right lung\par Kyphosis\par Uncoiled aorta\par Clear lungs without parenchymal infiltrates pleural effusions with dominant pulmonary nodules\par Minimal calcification aortic knob\par No interval change compared to chest x-ray of November 5, 2020\par \par PFT 4/29/21\par Nl Flow  Rates \par Nl Lung Volumes\par DLCO 79 % pred\par NIOX 18 ppb WNL  4/29/21\par \par Repeat PFT\par Box December 3, 2020\par Flow rates are normal\par FEV1 FVC ratio 78\par Normal lung volumes by box\par Specific inductance and resistance are normal\par Diffusion normal 84% predicted.\par NIOX 15 normal range December 3, 2020\par \par PFT no bronchodilator November 5, 2020\par Well-preserved normal flow rates\par Mild obstructive pattern\par FEV1 FVC ratio is very mildly reduced 74\par Normal lung volumes\par  diffusion 72% predicted normal range\par Compared data dating back from April 2014 through May 2017 there is no decline of the flow rates although noted some decline in diffusion\par No patient is of short stature with increased weight of 225 pounds\par Also noted is a decline of the total lung capacity from 21.16 April 2017-4.80\par \par  chest x-ray PA lateral November 2020\par Borderline cardiomegaly\par Mild elevation right hemidiaphragm\par Grossly clear lung fields without parenchymal infiltrates pleural effusions or dominant pulmonary nodules\par Soft tissue bony structures grossly unremarkable\par Etelvina mediastinum demonstrates a mild calcification aortic knob\par \par Gross interval change compared to chest x-ray dating back to April 26, 2017\par \par Review of chest CT of May 8, 2017\par No pulmonary masses consolidation suspicious nodules\par Notation of the ascending thoracic aorta measured 4.3 cm\par No hiatal hernia\par Mild bronchiectasis right lower lobe\par Table linear atelectasis versus scar right lower lobe\par Mild\par Minor reticular opacities peripherally of lingula with mild atelectasis versus scarring\par Stable atelectasis right base\par Calcified granuloma right upper lobe\par Right upper lobe 2 mm noncalcified pulmonary nodule with benign\par \par Note this study was compared to study of September 13, 2016\par

## 2022-09-12 NOTE — PHYSICAL EXAM
[No Acute Distress] : no acute distress [Normal Oropharynx] : normal oropharynx [Normal Appearance] : normal appearance [No Neck Mass] : no neck mass [Normal Rate/Rhythm] : normal rate/rhythm [Normal S1, S2] : normal s1, s2 [No Murmurs] : no murmurs [No Resp Distress] : no resp distress [No Acc Muscle Use] : no acc muscle use [Normal Palpation] : normal palpation [Normal Rhythm and Effort] : normal rhythm and effort [Clear to Auscultation Bilaterally] : clear to auscultation bilaterally [Normal to Percussion] : normal to percussion [No Abnormalities] : no abnormalities [Benign] : benign [Normal Gait] : normal gait [No Clubbing] : no clubbing [No Cyanosis] : no cyanosis [No Edema] : no edema [FROM] : FROM [Normal Color/ Pigmentation] : normal color/ pigmentation [No Focal Deficits] : no focal deficits [Oriented x3] : oriented x3 [Normal Affect] : normal affect

## 2022-10-21 ENCOUNTER — APPOINTMENT (OUTPATIENT)
Dept: GASTROENTEROLOGY | Facility: CLINIC | Age: 72
End: 2022-10-21

## 2023-03-13 ENCOUNTER — APPOINTMENT (OUTPATIENT)
Dept: PULMONOLOGY | Facility: CLINIC | Age: 73
End: 2023-03-13
Payer: MEDICARE

## 2023-03-13 VITALS
HEART RATE: 75 BPM | DIASTOLIC BLOOD PRESSURE: 87 MMHG | OXYGEN SATURATION: 95 % | SYSTOLIC BLOOD PRESSURE: 150 MMHG | RESPIRATION RATE: 16 BRPM

## 2023-03-13 DIAGNOSIS — R93.89 ABNORMAL FINDINGS ON DIAGNOSTIC IMAGING OF OTHER SPECIFIED BODY STRUCTURES: ICD-10-CM

## 2023-03-13 DIAGNOSIS — R06.2 WHEEZING: ICD-10-CM

## 2023-03-13 DIAGNOSIS — J44.9 CHRONIC OBSTRUCTIVE PULMONARY DISEASE, UNSPECIFIED: ICD-10-CM

## 2023-03-13 LAB — POCT - HEMOGLOBIN (HGB), QUANTITATIVE, TRANSCUTANEOUS: 14.8

## 2023-03-13 PROCEDURE — 99214 OFFICE O/P EST MOD 30 MIN: CPT | Mod: 25

## 2023-03-13 PROCEDURE — 88738 HGB QUANT TRANSCUTANEOUS: CPT

## 2023-03-13 PROCEDURE — 94010 BREATHING CAPACITY TEST: CPT

## 2023-03-13 PROCEDURE — ZZZZZ: CPT

## 2023-03-13 PROCEDURE — 94727 GAS DIL/WSHOT DETER LNG VOL: CPT

## 2023-03-13 PROCEDURE — 94729 DIFFUSING CAPACITY: CPT

## 2023-03-13 RX ORDER — METOPROLOL SUCCINATE 25 MG/1
25 TABLET, EXTENDED RELEASE ORAL
Qty: 90 | Refills: 0 | Status: ACTIVE | COMMUNITY
Start: 2023-03-03

## 2023-03-13 NOTE — DISCUSSION/SUMMARY
[FreeTextEntry1] : Overall stable reps ststus with stable pulmonary physiology\par Cardiology - w/u palpitations  added BB metoprolol 25 mg to   start\par Not using ANORO\par  occ nocturnal  wheeze\par \par COVID 19 infection Jan 7 2022\par did not receive monoclonal antibody infusion\par Chest x-ray completed\par Schedule PFT completed\par Patient is already on aspirin protocol and vitamin D\par Notify office of any change in status prior to next visit\par Based on a review of the prior CAT scan of the chest although no clinical signs of interstitial lung disease with crackles cannot rule out more subtle interstitial lung disease NSIP- note CT March of AB 2020 - lower  chest negative\par CT chest follow-up adressed if any  indication for change in respiratory status\par Singulair 1 tablet p.o. daily with food OFF\par ANORO 1 puff QD OFF\par ProAir rescue inhaler 2 puffs every 6 hours as needed with MDI instruction\par Notify of any wheezing.  Notify if rescue inhaler is needed greater than 2-3 times in any week.  Avoid known triggers.\par \par at f/u issue ICS  depending on bronchospasm- HOLD\par Advised to get COVID booster PFIZER  with variants\par f/u 6 months\par addressed Flu vaccine and ?? dose PCV 23  to complete pneumonia vaccine protocol per patient at Pharm\par  on BB new check f/u flow  rates and Resp sxs\par

## 2023-03-13 NOTE — HISTORY OF PRESENT ILLNESS
[Former] : former [< 20 pack-years] : < 20 pack-years [TextBox_4] : Occ nocturnal wheeze\par  pos palpitations w/u with [facility]  ST on going [facility]\par \par January 7, 2022 pos COVID\par Denies  any active Resp sxs\par pending ? R ankle reconstruction\par post pos recovery better occ nocturnal wheeze - very little\par SXS with COVID\par Headache nausea abdominal discomfort chest congestion felt like she needs to only be in bed\par Fatigue fever\par Most of the symptoms have resolved she does have residual chest congestion\par She was short of breath and was not seen at the [facility] emergency department January 6\par They did not do blood work or x-ray\par They did walker noted O2 saturation 94% and discharged home\par She does have some nocturnal wheeze that responds to her rescue inhaler\par Currently is not on prior prescribed ANORO ELLIPTA with singular\par January 2 noted that she did home rapid antigen which was positive for COVID-19\par She is completed the 2 dose protocol for Covid positive but is pending booster\par Past medical history coronary artery disease obstructive airway disease diabetes. \par Smoking Status: never \par \par addednum HLD per card- Repbeth

## 2023-03-13 NOTE — PROCEDURE
[FreeTextEntry1] : PFT 3/13/23\par  Very Mild OAD\par  lung volumes nl\par  DLCO 81 %\par  HGB 14.8\par \par PFT 9/12/22\par well preserved Flow rates\par  very mild OAD\par  lung volumes nl\par pos  airtrapping\par  DLCO  89 % WNL\par HGB 13.8\par \par PFT 3/7/22\par  flow rates nl\par Lung Volumes nl\par  DLCO  82 %\par HGB 12.7\par \par Chest x-ray PA lateral 3/7/22\par Mild elevation right hemidiaphragm with mild volume loss right lung\par Kyphosis\par Uncoiled aorta\par Clear lungs without parenchymal infiltrates pleural effusions with dominant pulmonary nodules\par Minimal calcification aortic knob\par No interval change compared to chest x-ray of November 5, 2020\par \par PFT 4/29/21\par Nl Flow  Rates \par Nl Lung Volumes\par DLCO 79 % pred\par NIOX 18 ppb WNL  4/29/21\par \par Repeat PFT\par Box December 3, 2020\par Flow rates are normal\par FEV1 FVC ratio 78\par Normal lung volumes by box\par Specific inductance and resistance are normal\par Diffusion normal 84% predicted.\par NIOX 15 normal range December 3, 2020\par \par PFT no bronchodilator November 5, 2020\par Well-preserved normal flow rates\par Mild obstructive pattern\par FEV1 FVC ratio is very mildly reduced 74\par Normal lung volumes\par  diffusion 72% predicted normal range\par Compared data dating back from April 2014 through May 2017 there is no decline of the flow rates although noted some decline in diffusion\par No patient is of short stature with increased weight of 225 pounds\par Also noted is a decline of the total lung capacity from 21.16 April 2017-4.80\par \par  chest x-ray PA lateral November 2020\par Borderline cardiomegaly\par Mild elevation right hemidiaphragm\par Grossly clear lung fields without parenchymal infiltrates pleural effusions or dominant pulmonary nodules\par Soft tissue bony structures grossly unremarkable\par Etelvina mediastinum demonstrates a mild calcification aortic knob\par \par Gross interval change compared to chest x-ray dating back to April 26, 2017\par \par Review of chest CT of May 8, 2017\par No pulmonary masses consolidation suspicious nodules\par Notation of the ascending thoracic aorta measured 4.3 cm\par No hiatal hernia\par Mild bronchiectasis right lower lobe\par Table linear atelectasis versus scar right lower lobe\par Mild\par Minor reticular opacities peripherally of lingula with mild atelectasis versus scarring\par Stable atelectasis right base\par Calcified granuloma right upper lobe\par Right upper lobe 2 mm noncalcified pulmonary nodule with benign\par \par Note this study was compared to study of September 13, 2016\par

## 2023-04-05 NOTE — ED PROVIDER NOTE - CONSTITUTIONAL NEGATIVE STATEMENT, MLM
PREOPERATIVE DIAGNOSIS:  • Rectal bleeding    POSTOPERATIVE DIAGNOSIS AND FINDINGS:  • Normal    PROCEDURE:  Colonoscopy to terminal ileum    SURGEON:  Richie Leal MD    ANESTHESIA:  MAC    SPECIMEN(S):  • none    DESCRIPTION:  In decubitus position digital rectal exam was normal. Colonoscope inserted under direct visualization of lumen to cecum confirmed by visualization of ileocecal valve and appendiceal orifice.  Ileocecal valve was intubated and terminal ileum is grossly normal.  Scope was slowly withdrawn circumferentially examining all mucosal surfaces.  Bowel preparation was good.  There were no mucosal abnormalities.  There is no diverticulosis.  No significant hemorrhoidal disease.  Tolerated well.    RECOMMENDATION FOR FUTURE SURVEILLANCE:  10 years    Richie Leal M.D.      
no fever and no chills.

## 2023-04-17 ENCOUNTER — APPOINTMENT (OUTPATIENT)
Dept: PULMONOLOGY | Facility: CLINIC | Age: 73
End: 2023-04-17

## 2023-05-08 ENCOUNTER — NON-APPOINTMENT (OUTPATIENT)
Age: 73
End: 2023-05-08

## 2023-05-08 ENCOUNTER — APPOINTMENT (OUTPATIENT)
Dept: ORTHOPEDIC SURGERY | Facility: CLINIC | Age: 73
End: 2023-05-08
Payer: MEDICARE

## 2023-05-08 DIAGNOSIS — M25.562 PAIN IN LEFT KNEE: ICD-10-CM

## 2023-05-08 PROCEDURE — 99212 OFFICE O/P EST SF 10 MIN: CPT

## 2023-05-08 RX ORDER — AMOXICILLIN 500 MG/1
500 CAPSULE ORAL
Qty: 12 | Refills: 0 | Status: ACTIVE | COMMUNITY
Start: 2023-05-08 | End: 1900-01-01

## 2023-05-08 RX ORDER — NAPROXEN 500 MG/1
500 TABLET ORAL
Qty: 30 | Refills: 0 | Status: ACTIVE | COMMUNITY
Start: 2023-05-08 | End: 1900-01-01

## 2023-05-08 RX ORDER — CELECOXIB 100 MG/1
100 CAPSULE ORAL TWICE DAILY
Qty: 20 | Refills: 0 | Status: DISCONTINUED | COMMUNITY
Start: 2023-05-08 | End: 2023-05-08

## 2023-05-08 RX ORDER — MELOXICAM 7.5 MG/1
7.5 TABLET ORAL TWICE DAILY
Qty: 28 | Refills: 0 | Status: DISCONTINUED | COMMUNITY
Start: 2023-05-08 | End: 2023-05-08

## 2023-05-08 NOTE — REASON FOR VISIT
[Initial Visit] : an initial visit for [Artificial Knee Joint] : an artificial knee joint [Knee Pain] : knee pain [FreeTextEntry2] : Left total knee replacement 2009 by

## 2023-05-08 NOTE — DISCUSSION/SUMMARY
[Medication Risks Reviewed] : Medication risks reviewed [1 Week] : in 1 week [de-identified] : Medication risk reviewed with the patient. X-ray results discussed with the patient. There is a mild lateral tracking of the patella. There is no x-ray to compare. Advised the patient to take NSAIDS for the next week, ICE ,rest and elevate. Patient is also encouraged to wear a knee brace to support. She will resume PT in a week. PT prescription send. Meloxicam prescribed. I will discuss the X-ray with Dr Moctezuma and get back to the patient if anything need to be changed.

## 2023-05-08 NOTE — REVIEW OF SYSTEMS
[Arthralgia] : arthralgia [Joint Pain] : joint pain [Joint Swelling] : joint swelling [Fever] : no fever [Chills] : no chills

## 2023-05-08 NOTE — HISTORY OF PRESENT ILLNESS
[de-identified] : Patient is a 73 yr old female, s/p left knee replacement in 2009 by Dr Moctezuam. Patient is here with c/o one week history of acute left knee pain mostly localized to the lateral side and posterior knee. Patient has history of OA of B/L ankles and she is seeing Doctors at [facility] for ankle replacement. They have recommended PT for her B/L ankle. Patient reports the pain started after the PT. She felt the pain when she got home, felt like something moved. Since then having difficulty with negotiating stairs, and lifting left leg. Patient reports left knee swelling, pain and difficulty moving the leg. Denies any fever,chills ,sob, or WB. She is ambulating with a cane.  [Stable] : stable [10] : a current pain level of 10/10 [Sitting] : sitting [Standing] : standing [Daily] : ~He/She~ states the symptoms seem to be occuring daily [Lifting] : worsened by lifting [Walking] : worsened by walking [Knee Flexion] : worsened with knee flexion [Knee Extension] : worsened with knee extension [Ice] : relieved by ice [NSAIDs] : relieved by nonsteroidal anti-inflammatory drugs [Rest] : relieved by rest [Ataxia] : no ataxia [Incontinence] : no incontinence [Loss of Dexterity] : good dexterity [Urinary Ret.] : no urinary retention

## 2023-05-08 NOTE — PHYSICAL EXAM
[Antalgic] : antalgic [Cane] : ambulates with cane [Knee Swelling Right] : no swelling [Knee Tenderness On Palpation Right] : no tenderness [Knee Swelling Left] : swelling [Knee Tenderness On Palpation Left] : tenderness [Knee Motion Left] : limited ROM [Knee Medial Instability Left] : no laxity on valgus stress [Knee Lateral Instability Left] : no  laxity on varus stress [de-identified] : Patient is AOX3, not in any acute distress. Patient walks in with mild antalgic gait using a cane. Left knee with mild swelling and TTP on the lateral aspect of the knee. Patient is able to flex the knee to 90 degrees and extent to -15. No calf tenderness, mild LE swelling with Good peripheral pulses. There is no AP laxity or ML laxity.  [de-identified] : 3 Views of the left knee is obtained. Left knee with prosthesis in good alignment with no obvious fracture or dislocation. Lateral tracking of the patella noticed. There is no x-ray to compare.

## 2023-05-10 ENCOUNTER — APPOINTMENT (OUTPATIENT)
Dept: ORTHOPEDIC SURGERY | Facility: CLINIC | Age: 73
End: 2023-05-10
Payer: MEDICARE

## 2023-05-10 ENCOUNTER — LABORATORY RESULT (OUTPATIENT)
Age: 73
End: 2023-05-10

## 2023-05-10 VITALS — SYSTOLIC BLOOD PRESSURE: 160 MMHG | DIASTOLIC BLOOD PRESSURE: 97 MMHG | HEART RATE: 77 BPM

## 2023-05-10 DIAGNOSIS — Z96.652 PRESENCE OF LEFT ARTIFICIAL KNEE JOINT: ICD-10-CM

## 2023-05-10 PROCEDURE — 99214 OFFICE O/P EST MOD 30 MIN: CPT | Mod: 25

## 2023-05-10 PROCEDURE — 20610 DRAIN/INJ JOINT/BURSA W/O US: CPT | Mod: LT

## 2023-05-10 RX ORDER — MELOXICAM 7.5 MG/1
7.5 TABLET ORAL
Qty: 42 | Refills: 1 | Status: ACTIVE | COMMUNITY
Start: 2023-05-10 | End: 1900-01-01

## 2023-05-11 ENCOUNTER — OUTPATIENT (OUTPATIENT)
Dept: OUTPATIENT SERVICES | Facility: HOSPITAL | Age: 73
LOS: 1 days | End: 2023-05-11
Payer: MEDICARE

## 2023-05-11 ENCOUNTER — APPOINTMENT (OUTPATIENT)
Dept: CT IMAGING | Facility: CLINIC | Age: 73
End: 2023-05-11
Payer: MEDICARE

## 2023-05-11 DIAGNOSIS — Z96.652 PRESENCE OF LEFT ARTIFICIAL KNEE JOINT: ICD-10-CM

## 2023-05-11 DIAGNOSIS — Z96.659 PRESENCE OF UNSPECIFIED ARTIFICIAL KNEE JOINT: Chronic | ICD-10-CM

## 2023-05-11 PROCEDURE — 73700 CT LOWER EXTREMITY W/O DYE: CPT | Mod: 26,LT,MH

## 2023-05-11 PROCEDURE — 73700 CT LOWER EXTREMITY W/O DYE: CPT

## 2023-05-12 LAB
CRP SERPL-MCNC: 5 MG/L
ERYTHROCYTE [SEDIMENTATION RATE] IN BLOOD BY WESTERGREN METHOD: 25 MM/HR
HCT VFR BLD CALC: 44.3 %
HGB BLD-MCNC: 13.7 G/DL
MCHC RBC-ENTMCNC: 27.7 PG
MCHC RBC-ENTMCNC: 30.9 GM/DL
MCV RBC AUTO: 89.5 FL
PLATELET # BLD AUTO: 192 K/UL
RBC # BLD: 4.95 M/UL
RBC # FLD: 13.8 %
WBC # FLD AUTO: 7.61 K/UL

## 2023-05-16 NOTE — PROCEDURE
[Aspiration] : Aspiration [Left] : of the left [Diagnostic] : Diagnostic [Patient] : patient [Risk] : risk [Benefits] : benefits [Alternatives] : alternatives [Bleeding] : bleeding [Infection] : infection [Allergic Reaction] : allergic reaction [Verbal Consent Obtained] : verbal consent was obtained prior to the procedure [Ethyl Chloride Spray] : ethyl chloride spray was used as a topical anesthetic [Superior] : superior [18] : an 18-gauge [___ mL Fluid] : [unfilled] mL of [Yellow] : yellow [Clear] : clear [Culture] : culture [Cell Count] : cell count [Gram Stain] : gram stain [Crystal Analysis] : crystal analysis [Tolerated Well] : The patient tolerated the procedure well [None] : none [FreeTextEntry1] : Chloraprep

## 2023-05-16 NOTE — CONSULT LETTER
[Dear  ___] : Dear  [unfilled], [Courtesy Letter:] : I had the pleasure of seeing your patient, [unfilled], in my office today. [Sincerely,] : Sincerely, [FreeTextEntry2] : JOSÉ LUIS COATES\par  [FreeTextEntry1] : I have attached my note for your review but would be happy to discuss our patient's care together should you have any questions or concerns. I have included my mobile and work phone numbers for your convenience. Thank you very much for allowing me to participate in the care of your patient.\par  [FreeTextEntry3] : Pietro Horvath MD\par Department of Orthopaedic Surgery\par [facility]\par Mobile: [phone]\par Work: [phone]\par Office: [phone]\par

## 2023-05-16 NOTE — HISTORY OF PRESENT ILLNESS
[de-identified] : Patient is a 73 yr old female resents for follow-up regarding left knee, s/p left knee replacement in 2009 by Dr Moctezuma. Patient is here with c/o one week history of acute left knee pain mostly localized to the lateral side and posterior knee. Patient has history of OA of B/L ankles and she is seeing Doctors at [facility] for ankle replacement. They have recommended PT for her B/L ankle. Patient reports the pain started after the PT. She felt the pain when she got home, felt like something moved. Since then having difficulty with negotiating stairs, and lifting left leg. Patient reports left knee swelling, pain and difficulty moving the leg.  She was seen in the office on 5/8 and x-rays were obtained that showed we will place components and an undersurface patella with patellofemoral arthritis.  Denies any fever,chills ,sob, or WB. She is ambulating with a walker.  She finds that she has difficulty with descending steps due to left knee pain.  It is anterior, lateral and posterior.

## 2023-05-16 NOTE — DISCUSSION/SUMMARY
[Medication Risks Reviewed] : Medication risks reviewed [Surgical risks reviewed] : Surgical risks reviewed [de-identified] : I discussed nonoperative and operative treatment options regarding their symptoms.  Overall, there appears to be no change in the overall alignment of their metal components.  They do appear to have patellofemoral arthritis with lateral maltracking and osteophyte formation.  She is having anterior and lateral facet knee pain and difficulty descending stairs consistent with patellofemoral arthritis..  I explained to them that there may be worsening arthritis of the knee However given the length of time the implants have been in I would like to obtain a CT scan to evaluate the bone around the implant and obtain inflammatory markers to rule out possible infection.  I will also obtain fluid from the knee today with a knee aspiration to send to the lab for cell count, crystals, Gram stain and culture to rule out infection.  I explained  that if the inflammatory markers and aspiration results were normal and the CT scan did show signs of loosening we would have to have discussion about possible further intervention.   If the inflammatory markers and aspiration come back concerning for infection then we would need to have a different discussion about operative intervention for irrigation and debridement.  In the interim, I would like her to complete a course of physical therapy work on quadricep strengthening.  Explained to her that if her quadricep strength improves and her symptoms resolve then we can attempt to avoid surgery.  \par \par I discussed with them that I would prescribe an anti-inflammatory that should be taken once a day with meals. They should not take this while also taking Aleve (Naprosyn), Motrin/ Advil (Ibuprofen), Toradol (ketoralac). They must stop taking it if they develops stomach pain, increased bleeding or bruising and they should follow-up with their primary care doctor for routine blood work including kidney function to monitor its effect. While it Is not a habit-forming substance, it should only  be taken as needed and to discontinue use once symptoms have resolved.\par \par My cumulative time spent on this patient’s visit included: Preparation for the visit, review of the medical records, review of pertinent diagnostic studies, examination and counseling of the patient on the above diagnosis, treatment plan and prognosis, orders of diagnostic tests, medications and/or appropriate procedures and documentation in the medical records of today’s visit.\par \par \par \par After discussion, the patient understands and is in agreement with the plan. They know to reach out to the office with any questions or concerns. He knows to get in touch with our office should he have any questions or concerns. We discussed that should they develop worsening pain, numbness, weakness, inability to bear weight, fevers, chills, night sweats, chest pain, shortness of breath they should present to the emergency department as soon as possible. They know to reach out to our team with any questions or concerns.\par

## 2023-05-16 NOTE — ADDENDUM
[FreeTextEntry1] : Patient was seen face to face and needs a rolling walker as this is needed for activities of daily living within their home secondary to the diagnosis of osteoarthritis.  \par \par \par

## 2023-05-17 ENCOUNTER — APPOINTMENT (OUTPATIENT)
Dept: ORTHOPEDIC SURGERY | Facility: CLINIC | Age: 73
End: 2023-05-17
Payer: MEDICARE

## 2023-05-17 VITALS — SYSTOLIC BLOOD PRESSURE: 150 MMHG | DIASTOLIC BLOOD PRESSURE: 80 MMHG | HEART RATE: 73 BPM

## 2023-05-17 PROCEDURE — 99214 OFFICE O/P EST MOD 30 MIN: CPT | Mod: 25

## 2023-05-17 PROCEDURE — 20610 DRAIN/INJ JOINT/BURSA W/O US: CPT | Mod: LT

## 2023-05-17 NOTE — PHYSICAL EXAM
[de-identified] : General Appearance / Station: Well developed, well nourished, in no acute distress \par Orientation: Oriented to person, place, and time\par Gait & Station: Ambulates with walker for assistive device\par Neurologic: Normal leg sensation \par Cardiovascular: Warm extremity \par Lymphatics: No lymphedema \par Generalized Ligament Laxity: Normal \par Stiffness: Normal \par \par LUMBAR SPINE:\par Nontender  at lumbar spine\par Straight leg raise: Negative \par Motor: 5/5 motor L2-S1\par Sensation: Intact  L2-S1\par \par LEFT HIP:\par Range of motion: Painless  internal and external rotation of the hip.\par Strength: Within Normal Limits \par Palpation: Nontender  at greater trochanter. Nontender  at SI joint\par Stinchfield: Negative \par FADIR: Negative \par MILAD: Negative \par \par SYMPTOMATIC LEFT KNEE:\par Alignment: Neutral \par Skin: Well-healed midline incision\par Effusion: none .\par Quadriceps: normal .  Able to straight leg raise\par Range of motion: symmetrical but painful .\par PF crepitus: 1+.\par PF apprehension: none .\par Patella / Patella Tendon: nontender .\par Lachman's: negative \par Valgus @ 30°: negative.\par Varus @ 30°: negative.\par Posterior drawer: negative.\par Palpation: TENDER lateral facet of patella\par Meniscus signs: Negative\par \par ASYMPTOMATIC RIGHT KNEE:\par Alignment: Neutral\par Skin: Healed midline incision\par Effusion: none .\par Quadriceps: normal .\par Range of motion: symmetrical .\par PF crepitus: none .\par PF apprehension: none .\par Patella / Patella Tendon: nontender .\par Lachman's: negative \par Valgus @ 30°: negative.\par Varus @ 30°: negative.\par Posterior drawer: negative.\par Palpation: nontender.\par Meniscus signs: negative .\par \par  [de-identified] :    CT Knee No Cont, Left             Final  No Documents Attached       EXAM: [id number] - CT KNEE ONLY LT  - ORDERED BY: DAVEY CALDWELL   PROCEDURE DATE:  05/11/2023    INTERPRETATION:  CT OF THE LEFT KNEE  CLINICAL INFORMATION: Pain, status post total arthroplasty in 2009.  COMPARISON: None available.  TECHNIQUE: Axial CT images were obtained of the left knee with coronal and sagittal reconstructions. 3-D volume rendered reformats were also provided. No intravenous contrast was administered.  FINDINGS:  Osseous: Status post remote cemented total arthroplasty without posterior patellar remodeling. Likely recent transient lateral patellar dislocation injury including displaced osteochondral fragment in the lateral patellofemoral recess measuring approximately 1.5 cm in greatest dimension, favored to be arising from the inferomedial patellar facet and rim. Otherwise no acute displaced fracture, osteolysis, or subsidence. Mineralization within normal limits. No aggressive osseous neoplasm.  Soft tissues: Small volume postsurgical effusion. Mild patchy prepatellar and infrapatellar subcutaneous inflammatory stranding. No distended Baker's popliteal cyst, sizable hyperattenuating hematoma, or drainable encapsulated fluid collection.  IMPRESSION:  1.  Status post remote left knee total arthroplasty without convincing CT evidence for hardware loosening. Small volume postsurgical effusion. 2.  Suspect recent transient lateral patellar dislocation injury of the left knee including displaced 1.5 cm osteochondral fragment in the lateral patellofemoral recess.  --- End of Report ---       ARTEM STEVEN MD; Attending Radiologist This document has been electronically signed. May 15 2023  2:28PM      Ordered by: DAVEY CALDWELL       Collected/Examined: [id number] 08:11PM        Verification Required       Stage: Final         Performed at: [facility]       Resulted: [id number] 02:18PM       Last Updated: 15May2023 02:31PM       Accession: [id number]

## 2023-05-17 NOTE — DISCUSSION/SUMMARY
[Medication Risks Reviewed] : Medication risks reviewed [Surgical risks reviewed] : Surgical risks reviewed [de-identified] : I had extensive discussion with the patient that given the findings, I think that most of her symptoms are due to the kneecap being on resurfaced.  I explained to her that when this does happen you develop arthritis of the component we offer resurfacing of the patella to decrease the pain and allow for more ease with knee flexion and extension however this does require making the previous incisions and does have a long recovery period.  I also explained that we would likely exchange the polyethylene in between the knee.  She would like to think about it and see if her symptoms improve with conservative treatment.  I like to see her back in 2 to 4 weeks for repeat evaluation and see if she is making any progress.  She understands to reach out if she has any questions or concerns or if she develops fevers or chills.\par \par I often prescribe an anti-inflammatory that should be taken once a day with meals. They should not take this while also taking Aleve (Naprosyn), Motrin/ Advil (Ibuprofen), Toradol (ketoralac). They must stop taking it if they develops stomach pain, increased bleeding or bruising and they should follow-up with their primary care doctor for routine blood work including kidney function to monitor its effect. While it Is not a habit-forming substance, it should only  be taken as needed and to discontinue use once symptoms have resolved.  Would like to continue with over-the-counter medication at this time\par

## 2023-05-17 NOTE — CONSULT LETTER
[Dear  ___] : Dear  [unfilled], [Courtesy Letter:] : I had the pleasure of seeing your patient, [unfilled], in my office today. [Sincerely,] : Sincerely, [FreeTextEntry2] : JOSÉ LUIS COATES\par  [FreeTextEntry1] : I have attached my note for your review but would be happy to discuss our patient's care together should you have any questions or concerns. I have included my mobile and work phone numbers for your convenience. Thank you very much for allowing me to participate in the care of your patient.\par  [FreeTextEntry3] : Pietro Horvath MD\par Department of Orthopaedic Surgery\par [facility]\par Mobile: [phone]\par Work: [phone]\par Office: [phone]\par

## 2023-05-17 NOTE — HISTORY OF PRESENT ILLNESS
[de-identified] : WINSOME EDDY  is an 73 year female  presents today for follow-up of left knee pain. At our last visit they had left knee pain and some pain with climbing descending steps.  She has a history of a left total knee replacement with an unresurfaced patella in 2009.  She was overall doing well until about a couple months ago when she developed anterior and lateral patella knee pain.  She underwent a work-up that included an aspiration of the knee that was negative for infection as well as a CT scan to evaluate for signs of osteolysis.  CT scan shows articular cartilage damage to the patella and patellar maltracking.  Does not show any signs of loosening or osteolysis of the metal components.  She had been attending physical therapy but it was too painful for her.  She is currently using a cane and ordered a walker for herself due to the pain.  Symptoms are unchanged\par \par No fever, chills, or signs of infection. Today, patient does not state any other associated signs or complaints outside of those described. \par \par A complete review of symptoms as well as past medical/surgical history, medications, allergies, social and family history, and other details of HPI and exam were reviewed per first visit intake form and updated accordingly. Additional and more relevant details are noted in further detail today.\par

## 2023-05-30 DIAGNOSIS — M25.462 EFFUSION, LEFT KNEE: ICD-10-CM

## 2023-08-22 ENCOUNTER — APPOINTMENT (OUTPATIENT)
Dept: OBGYN | Facility: CLINIC | Age: 73
End: 2023-08-22
Payer: MEDICARE

## 2023-08-22 PROCEDURE — 82270 OCCULT BLOOD FECES: CPT

## 2023-08-22 PROCEDURE — G0101: CPT

## 2023-08-22 PROCEDURE — 99213 OFFICE O/P EST LOW 20 MIN: CPT | Mod: 25

## 2023-08-22 PROCEDURE — 81002 URINALYSIS NONAUTO W/O SCOPE: CPT

## 2023-08-28 ENCOUNTER — OUTPATIENT (OUTPATIENT)
Dept: OUTPATIENT SERVICES | Facility: HOSPITAL | Age: 73
LOS: 1 days | End: 2023-08-28
Payer: MEDICARE

## 2023-08-28 ENCOUNTER — APPOINTMENT (OUTPATIENT)
Dept: MAMMOGRAPHY | Facility: IMAGING CENTER | Age: 73
End: 2023-08-28
Payer: MEDICARE

## 2023-08-28 DIAGNOSIS — Z00.8 ENCOUNTER FOR OTHER GENERAL EXAMINATION: ICD-10-CM

## 2023-08-28 DIAGNOSIS — Z96.659 PRESENCE OF UNSPECIFIED ARTIFICIAL KNEE JOINT: Chronic | ICD-10-CM

## 2023-08-28 PROCEDURE — 77063 BREAST TOMOSYNTHESIS BI: CPT | Mod: 26

## 2023-08-28 PROCEDURE — 77063 BREAST TOMOSYNTHESIS BI: CPT

## 2023-08-28 PROCEDURE — 77067 SCR MAMMO BI INCL CAD: CPT | Mod: 26

## 2023-08-28 PROCEDURE — 77067 SCR MAMMO BI INCL CAD: CPT

## 2023-09-06 ENCOUNTER — APPOINTMENT (OUTPATIENT)
Dept: ORTHOPEDIC SURGERY | Facility: CLINIC | Age: 73
End: 2023-09-06
Payer: MEDICARE

## 2023-09-06 VITALS
DIASTOLIC BLOOD PRESSURE: 84 MMHG | BODY MASS INDEX: 42.52 KG/M2 | HEIGHT: 63 IN | WEIGHT: 240 LBS | SYSTOLIC BLOOD PRESSURE: 142 MMHG | HEART RATE: 77 BPM

## 2023-09-06 DIAGNOSIS — Z96.652 PAIN DUE TO INTERNAL ORTHOPEDIC PROSTHETIC DEVICES, IMPLANTS AND GRAFTS, SUBSEQUENT ENCOUNTER: ICD-10-CM

## 2023-09-06 DIAGNOSIS — T84.84XD PAIN DUE TO INTERNAL ORTHOPEDIC PROSTHETIC DEVICES, IMPLANTS AND GRAFTS, SUBSEQUENT ENCOUNTER: ICD-10-CM

## 2023-09-06 PROCEDURE — 99215 OFFICE O/P EST HI 40 MIN: CPT

## 2023-09-06 NOTE — DISCUSSION/SUMMARY
[de-identified] : WINSOME EDDY  is an 73 year-old female with patellofemoral arthritis of their Left knee after an unresurfaced left total knee replacement. The patient and I reviewed their chief complaint, history of present illness, radiology findings, differential diagnosis and the pros, cons, alternatives, risks, and benefits of further conservative treatment versus operative intervention. Based upon the patients continued symptoms and failure to respond to conservative treatment and after a shared decision making process, the patient would like to proceed with a left patellofemoral replacement.  Explained that at the time of the operation would likely exchange the plastic if we saw signs of wear   As a result of the replacement, the patients specific functional goal is to go on walks with less pain.  A long discussion took place with the patient describing what a patellofemoral replacement with possible revision total knee replacement is and what the procedure would entail. I explained that this is a major surgery with significant procedure risk factors. The benefits of surgery were discussed with the patient including the potential for improving his/her current clinical condition through operative intervention. Alternatives to surgical intervention including continued conservative management were also discussed in detail.     The patient  clearly understand that there is no guarantee of improvement with either treatment option, both carry risks including worsening pain. They also understand the indications and limitations of surgery and the need for post operative rehabilitation / recovery. Healing times vary greatly among patients and this was discussed. All patient questions were answered satisfactorily. Risks, benefits, and alternatives to surgery have been discussed with the patient including but not limited to bleeding and need for blood transfusion, infection, intraoperative or postoperative fracture, hardware or implant failure, neurovascular injury, thigh or knee numbness, chronic pain and scarring, stiffness, chronic tendonitis or swelling, dislocation, leg-length discrepancy, the potential need for future surgery, DVT, PE, heart attack, stroke and death. The patient was told that we will take steps to minimize these risks by using sterile technique, antibiotics and DVT prophylaxis when appropriate and follow the patient postoperatively in the office setting to monitor progress but we cannot eliminate these risks completely. The possibility of recurrent pain, no improvement in pain and actual worsening of pain were also discussed with the patient.   The patient demonstrates understanding and wishes to proceed. After our long discussion the final decision for surgery was made today. The patient will sit down with the surgical coordinator to discuss clearances and complete necessary paperwork.   During the shared decision making process, educational tools including implant model and patient x-rays were used to discuss implant type and function. Kiefer, Enovis/ DJO and Rocio [employer] implants are the implants I am most comfortable utilizing in my patellofemoral and revision total knee replacements and the implant I am planning for their knee. If the patient wishes to utilize a different implant brand or type for their knee they may obtain an additional surgical opinion from a surgeon utilizing that brand of implant. The hospitalization and post-operative care and rehabilitation were also discussed. The use of perioperative antibiotics and DVT prophylaxis were discussed. The patient was also advised of risks related to the medical comorbidities and elevated body mass index (BMI). The discharge plan of care focused on the patient going home following surgery and the importance to remove trip and fall hazards about the house prior to admission so that they decrease the risk of fall once discharged as a fall can be catastrophic after total joint replacement. I encouraged the patient to make the necessary arrangements to have someone stay with them when they are discharged home.  Home physical therapy will commence following discharge provided it is appropriate and covered by their health insurance benefit plan.    All questions were answered to the satisfaction of the patient. The patient agreed to the plan of care as well as the use of implants in their revision joint replacement.   The patient was advised that they will require a medical preoperative risk evaluation by their PCP and cardiologist.. Further medical subspecialty clearances may be indicated if felt needed by their PCP.

## 2023-09-06 NOTE — PHYSICAL EXAM
[de-identified] : General Appearance / Station: Well developed, well nourished, in no acute distress  Orientation: Oriented to person, place, and time Gait & Station: Ambulates with cane for assistive device Neurologic: Normal leg sensation  Cardiovascular: Warm extremity  Lymphatics: No lymphedema  Generalized Ligament Laxity: Normal  Stiffness: Normal   LUMBAR SPINE: Nontender  at lumbar spine Straight leg raise: Negative  Motor: 5/5 motor L2-S1 Sensation: Intact  L2-S1  LEFT HIP: Range of motion: Painless  internal and external rotation of the hip. Strength: Within Normal Limits  Palpation: Nontender  at greater trochanter. Nontender  at SI joint Stinchfield: Negative  FADIR: Negative  MILAD: Negative   SYMPTOMATIC LEFT KNEE: Alignment: Neutral  Skin: Well-healed midline incision Effusion: none . Quadriceps: normal .  Able to straight leg raise Range of motion: symmetrical but painful with extension and flexion PF crepitus: 1+. PF apprehension: none . Patella / Patella Tendon: Tender at medial and lateral patellar facet Lachman's: negative  Valgus @ 30: negative. Varus @ 30: negative. Posterior drawer: negative. Palpation: TENDER l medial and ateral facet of patella Meniscus signs: Negative  ASYMPTOMATIC RIGHT KNEE: Alignment: Neutral Skin: Healed midline incision Effusion: none . Quadriceps: normal . Range of motion: symmetrical . PF crepitus: none . PF apprehension: none . Patella / Patella Tendon: nontender . Lachman's: negative  Valgus @ 30: negative. Varus @ 30: negative. Posterior drawer: negative. Palpation: nontender. Meniscus signs: negative .

## 2023-09-06 NOTE — HISTORY OF PRESENT ILLNESS
[de-identified] : WINSOME EDDY  is an 73 year female  presents today for follow-up of left knee pain. At our last visit they had left knee pain and some pain with climbing descending steps.  She has a history of a left total knee replacement with an unresurfaced patella in 2009.  She was overall doing well until about a couple months ago when she developed anterior and lateral patella knee pain.  She underwent a work-up that included an aspiration of the knee that was negative for infection as well as a CT scan to evaluate for signs of osteolysis.  CT scan shows articular cartilage damage to the patella and patellar maltracking.  She has done physical therapy but continues to have pain.  She also has right ankle osteoarthritis that will require a fusion operation.  She was instructed by her foot and ankle doctor to undergo knee revision and repeat recuperation prior to the right ankle as she will need to be nonweightbearing on that right ankle for extended period of time  No fever, chills, or signs of infection. Today, patient does not state any other associated signs or complaints outside of those described.   A complete review of symptoms as well as past medical/surgical history, medications, allergies, social and family history, and other details of HPI and exam were reviewed per first visit intake form and updated accordingly. Additional and more relevant details are noted in further detail today.

## 2023-12-04 ENCOUNTER — APPOINTMENT (OUTPATIENT)
Dept: ORTHOPEDIC SURGERY | Facility: HOSPITAL | Age: 73
End: 2023-12-04

## 2024-02-26 NOTE — ED ADULT NURSE NOTE - BOWEL SOUNDS RLQ
[FreeTextEntry1] : 50-year-old woman with a 30-year history of hypertension as well as proteinuria, referred by Dr. Baugh.  She was thoroughly evaluated urologically because of microscopic hematuria and no abnormalities were found.  Her kidneys look normal on ultrasound, IgA level is normal.  Proteinuria has been in the range of 3 to 400 mg.  Her creatinine had risen all the way from 0.9 up to 1.36, but has now improved back to 1.14, with GFR up to 59 from a previous level of 48.  K is normal at 4.1, PTH okay at 68.  Her BP has improved, on amlodipine 5 mg daily, and an increase in telmisartan to 80 mg daily.  Her BP was 146/93 prior to those changes.
present

## 2024-04-23 NOTE — DISCHARGE NOTE ADULT - LAUNCH MEDICATION RECONCILIATION
<<-----Click here for Discharge Medication Review PROVIDER:[TOKEN:[[id number]],FOLLOWUP:[4-6 Days]]

## 2024-06-21 NOTE — REASON FOR VISIT
36.7 [Consultation] : a consultation visit [FreeTextEntry1] : History of reflux, dysphagia, history of colon polyps

## 2024-08-18 ENCOUNTER — EMERGENCY (EMERGENCY)
Facility: HOSPITAL | Age: 74
LOS: 1 days | Discharge: HOME | End: 2024-08-18
Attending: STUDENT IN AN ORGANIZED HEALTH CARE EDUCATION/TRAINING PROGRAM
Payer: MEDICARE

## 2024-08-18 VITALS
OXYGEN SATURATION: 96 % | DIASTOLIC BLOOD PRESSURE: 85 MMHG | SYSTOLIC BLOOD PRESSURE: 175 MMHG | RESPIRATION RATE: 17 BRPM | HEART RATE: 67 BPM

## 2024-08-18 VITALS
OXYGEN SATURATION: 95 % | TEMPERATURE: 98 F | DIASTOLIC BLOOD PRESSURE: 99 MMHG | HEART RATE: 74 BPM | WEIGHT: 214.95 LBS | SYSTOLIC BLOOD PRESSURE: 172 MMHG | HEIGHT: 63 IN | RESPIRATION RATE: 20 BRPM

## 2024-08-18 DIAGNOSIS — Z96.659 PRESENCE OF UNSPECIFIED ARTIFICIAL KNEE JOINT: Chronic | ICD-10-CM

## 2024-08-18 LAB
ALBUMIN SERPL ELPH-MCNC: 4.5 G/DL — SIGNIFICANT CHANGE UP (ref 3.3–5)
ALP SERPL-CCNC: 133 U/L — HIGH (ref 40–120)
ALT FLD-CCNC: 22 U/L — SIGNIFICANT CHANGE UP (ref 10–45)
ANION GAP SERPL CALC-SCNC: 13 MMOL/L — SIGNIFICANT CHANGE UP (ref 5–17)
APPEARANCE UR: CLEAR — SIGNIFICANT CHANGE UP
AST SERPL-CCNC: 19 U/L — SIGNIFICANT CHANGE UP (ref 10–40)
BACTERIA # UR AUTO: NEGATIVE /HPF — SIGNIFICANT CHANGE UP
BASOPHILS # BLD AUTO: 0.04 K/UL — SIGNIFICANT CHANGE UP (ref 0–0.2)
BASOPHILS NFR BLD AUTO: 0.5 % — SIGNIFICANT CHANGE UP (ref 0–2)
BILIRUB SERPL-MCNC: 0.2 MG/DL — SIGNIFICANT CHANGE UP (ref 0.2–1.2)
BILIRUB UR-MCNC: NEGATIVE — SIGNIFICANT CHANGE UP
BUN SERPL-MCNC: 25 MG/DL — HIGH (ref 7–23)
CALCIUM SERPL-MCNC: 10.2 MG/DL — SIGNIFICANT CHANGE UP (ref 8.4–10.5)
CAST: 0 /LPF — SIGNIFICANT CHANGE UP (ref 0–4)
CHLORIDE SERPL-SCNC: 106 MMOL/L — SIGNIFICANT CHANGE UP (ref 96–108)
CO2 SERPL-SCNC: 25 MMOL/L — SIGNIFICANT CHANGE UP (ref 22–31)
COLOR SPEC: YELLOW — SIGNIFICANT CHANGE UP
CREAT SERPL-MCNC: 0.67 MG/DL — SIGNIFICANT CHANGE UP (ref 0.5–1.3)
DIFF PNL FLD: NEGATIVE — SIGNIFICANT CHANGE UP
EGFR: 92 ML/MIN/1.73M2 — SIGNIFICANT CHANGE UP
EOSINOPHIL # BLD AUTO: 0.18 K/UL — SIGNIFICANT CHANGE UP (ref 0–0.5)
EOSINOPHIL NFR BLD AUTO: 2.3 % — SIGNIFICANT CHANGE UP (ref 0–6)
GLUCOSE SERPL-MCNC: 127 MG/DL — HIGH (ref 70–99)
GLUCOSE UR QL: NEGATIVE MG/DL — SIGNIFICANT CHANGE UP
HCT VFR BLD CALC: 44.8 % — SIGNIFICANT CHANGE UP (ref 34.5–45)
HGB BLD-MCNC: 14 G/DL — SIGNIFICANT CHANGE UP (ref 11.5–15.5)
HYALINE CASTS # UR AUTO: PRESENT
IMM GRANULOCYTES NFR BLD AUTO: 0.6 % — SIGNIFICANT CHANGE UP (ref 0–0.9)
KETONES UR-MCNC: ABNORMAL MG/DL
LEUKOCYTE ESTERASE UR-ACNC: ABNORMAL
LIDOCAIN IGE QN: 23 U/L — SIGNIFICANT CHANGE UP (ref 7–60)
LYMPHOCYTES # BLD AUTO: 1.48 K/UL — SIGNIFICANT CHANGE UP (ref 1–3.3)
LYMPHOCYTES # BLD AUTO: 18.5 % — SIGNIFICANT CHANGE UP (ref 13–44)
MCHC RBC-ENTMCNC: 27.6 PG — SIGNIFICANT CHANGE UP (ref 27–34)
MCHC RBC-ENTMCNC: 31.3 GM/DL — LOW (ref 32–36)
MCV RBC AUTO: 88.2 FL — SIGNIFICANT CHANGE UP (ref 80–100)
MONOCYTES # BLD AUTO: 0.61 K/UL — SIGNIFICANT CHANGE UP (ref 0–0.9)
MONOCYTES NFR BLD AUTO: 7.6 % — SIGNIFICANT CHANGE UP (ref 2–14)
NEUTROPHILS # BLD AUTO: 5.62 K/UL — SIGNIFICANT CHANGE UP (ref 1.8–7.4)
NEUTROPHILS NFR BLD AUTO: 70.5 % — SIGNIFICANT CHANGE UP (ref 43–77)
NITRITE UR-MCNC: NEGATIVE — SIGNIFICANT CHANGE UP
NRBC # BLD: 0 /100 WBCS — SIGNIFICANT CHANGE UP (ref 0–0)
PH UR: 5 — SIGNIFICANT CHANGE UP (ref 5–8)
PLATELET # BLD AUTO: 203 K/UL — SIGNIFICANT CHANGE UP (ref 150–400)
POTASSIUM SERPL-MCNC: 4.4 MMOL/L — SIGNIFICANT CHANGE UP (ref 3.5–5.3)
POTASSIUM SERPL-SCNC: 4.4 MMOL/L — SIGNIFICANT CHANGE UP (ref 3.5–5.3)
PROT SERPL-MCNC: 7.6 G/DL — SIGNIFICANT CHANGE UP (ref 6–8.3)
PROT UR-MCNC: NEGATIVE MG/DL — SIGNIFICANT CHANGE UP
RBC # BLD: 5.08 M/UL — SIGNIFICANT CHANGE UP (ref 3.8–5.2)
RBC # FLD: 13.6 % — SIGNIFICANT CHANGE UP (ref 10.3–14.5)
RBC CASTS # UR COMP ASSIST: 0 /HPF — SIGNIFICANT CHANGE UP (ref 0–4)
REVIEW: SIGNIFICANT CHANGE UP
SODIUM SERPL-SCNC: 144 MMOL/L — SIGNIFICANT CHANGE UP (ref 135–145)
SP GR SPEC: 1.02 — SIGNIFICANT CHANGE UP (ref 1–1.03)
SQUAMOUS # UR AUTO: 2 /HPF — SIGNIFICANT CHANGE UP (ref 0–5)
UROBILINOGEN FLD QL: 0.2 MG/DL — SIGNIFICANT CHANGE UP (ref 0.2–1)
WBC # BLD: 7.98 K/UL — SIGNIFICANT CHANGE UP (ref 3.8–10.5)
WBC # FLD AUTO: 7.98 K/UL — SIGNIFICANT CHANGE UP (ref 3.8–10.5)
WBC UR QL: 3 /HPF — SIGNIFICANT CHANGE UP (ref 0–5)

## 2024-08-18 PROCEDURE — 96374 THER/PROPH/DIAG INJ IV PUSH: CPT | Mod: XU

## 2024-08-18 PROCEDURE — 74177 CT ABD & PELVIS W/CONTRAST: CPT | Mod: 26,MC

## 2024-08-18 PROCEDURE — 85025 COMPLETE CBC W/AUTO DIFF WBC: CPT

## 2024-08-18 PROCEDURE — 81001 URINALYSIS AUTO W/SCOPE: CPT

## 2024-08-18 PROCEDURE — 99285 EMERGENCY DEPT VISIT HI MDM: CPT | Mod: GC

## 2024-08-18 PROCEDURE — 80053 COMPREHEN METABOLIC PANEL: CPT

## 2024-08-18 PROCEDURE — 83690 ASSAY OF LIPASE: CPT

## 2024-08-18 PROCEDURE — 87086 URINE CULTURE/COLONY COUNT: CPT

## 2024-08-18 PROCEDURE — 74177 CT ABD & PELVIS W/CONTRAST: CPT | Mod: MC

## 2024-08-18 PROCEDURE — 99284 EMERGENCY DEPT VISIT MOD MDM: CPT | Mod: 25

## 2024-08-18 RX ORDER — ACETAMINOPHEN 500 MG
1000 TABLET ORAL ONCE
Refills: 0 | Status: COMPLETED | OUTPATIENT
Start: 2024-08-18 | End: 2024-08-18

## 2024-08-18 RX ADMIN — Medication 400 MILLIGRAM(S): at 21:07

## 2024-08-18 NOTE — ED ADULT TRIAGE NOTE - CHIEF COMPLAINT QUOTE
right sided flank pain radiating to the RLQ and nausea that started on 8/16. Denies chest pain, sob, urinary symptoms, vomiting, diarrhea, fevers/chills

## 2024-08-18 NOTE — ED ADULT NURSE NOTE - NSFALLRISKINTERV_ED_ALL_ED

## 2024-08-18 NOTE — ED PROVIDER NOTE - PHYSICAL EXAMINATION
Dominik Rizzo DO (PGY1)   Physical Exam:    Gen: NAD, AOx3  Head: NCAT  HEENT: EOMI, PEERLA  Lung: CTAB, no respiratory distress, no wheezes/rhonchi/rales B/L  CV: RRR, no murmurs, rubs or gallops  Abd: soft, NT, ND, no guarding, no rigidity, no rebound tenderness, no CVA tenderness   MSK: no visible deformities, ROM normal in UE/LE, no back pain  Neuro: No focal sensory or motor deficits. Sensation intact to light touch all extremities.  Skin: Warm, well perfused, no rash, no leg swelling  Psych: normal affect, calm Dominik Rizzo DO (PGY1)   Physical Exam:    Gen: NAD, AOx3  Head: NCAT  HEENT: EOMI, PEERLA  Lung: CTAB, no respiratory distress, no wheezes/rhonchi/rales B/L  CV: RRR, no murmurs, rubs or gallops  Abd: soft, right sided flank discomfort with palpation, ND, no guarding, no rigidity, no rebound tenderness, no CVA tenderness, negative murphys  MSK: no visible deformities, ROM normal in UE/LE, no back pain, bilateral ankle swelling without pitting edema  Neuro: No focal sensory or motor deficits. Sensation intact to light touch all extremities.  Skin: Warm, well perfused, no rash, no leg swelling  Psych: normal affect, calm hard copy

## 2024-08-18 NOTE — ED PROVIDER NOTE - PATIENT PORTAL LINK FT
You can access the FollowMyHealth Patient Portal offered by [facility] by registering at the following website: [email or web address]. By joining [email or web address] FollowMyHealth portal, you will also be able to view your health information using other applications (apps) compatible with our system.

## 2024-08-18 NOTE — ED PROVIDER NOTE - CLINICAL SUMMARY MEDICAL DECISION MAKING FREE TEXT BOX
74-year-old female presents to ED with past medical history of hypertension, hyperlipidemia, total hysterectomy presents to ED with a 2-day history of right flank pain.  Reports swelling around the abdomen on the right side. No new urinary symptoms.  Last bowel movement was yesterday was reported normal. On physical exam right flank is not worsened with palpation.  Negative Stein sign.  Will obtain blood work and CT abdomen.  Evaluate for gallstones versus nephrolithiasis versus diverticulitis versus colitis. 74-year-old female presents to ED with past medical history of hypertension, hyperlipidemia, total hysterectomy presents to ED with a 2-day history of right flank pain.  Reports swelling around the abdomen on the right side. No new urinary symptoms.  Last bowel movement was yesterday was reported normal. On physical exam right flank is not worsened with palpation.  Negative Stein sign.  Will obtain blood work and CT abdomen.  Evaluate for gallstones versus nephrolithiasis versus diverticulitis versus colitis.    Attending Nello: See attending attestation.

## 2024-08-18 NOTE — ED PROVIDER NOTE - ATTENDING CONTRIBUTION TO CARE
Attending Hannah: I performed a history and physical exam of the patient and discussed their management with the resident/fellow/student. I have reviewed the resident/fellow/student note and agree with the documented findings and plan of care, except as noted. I have personally performed a substantive portion of the visit including all aspects of the medical decision making. My medical decision making and observations are found below. Please refer to any progress notes for updates on clinical course. My notes supersedes the above resident/fellow/student note in case of discrepancy    MDM:  75 y/o F w/ PMH of HLD, HTN, total abdominal hysterectomy presents to the ED with a 2-day history of right flank pain. Seen w/ daughter. States that the flank pain starts in the front that radiates to the back on her R flank. Reports feeling like she is having mild swelling around the abdomen as well.  States that she is unable to take a deep breath because it causes her more pain. Reports that it is better with sitting and worse with lying down. Pain worse w/ movement/positional changes. Reports having urinary incontinence for a few years. Denies any fevers, chills, chest pain, shortness of breath, new urinary symptoms.    Gen: NAD, AOx3, able to make needs known, non-toxic  Head: NCAT  HEENT: EOMI, oral mucosa moist, normal conjunctiva  Lung: no respiratory distress, CTAB, no wheezes/rhonchi/rales B/L, speaking in full sentences  CV: RRR, no murmurs  Abd: non distended, soft, mild R sided tenderness, no guarding, mild R CVA tenderness  MSK: no visible deformities  Neuro: Appears non focal  Skin: Warm, well perfused  Psych: normal affect     Pt w/ R abd/flank pain. Will obtain CT a/p to eval for acute pathology. Eval for renal stones. eval for uti/pyelo, but low suspicion. Do not suspect aortic pathology/ACS/PE/PTX/PNA. Less likely billiary pathology. Possible MSK pain as symptoms worse w/ movement and change in position. Plan for labs, imaging, meds. Will reassess the need for additional interventions as clinically warranted. Refer to any progress notes for updates on clinical course and as a continuation of this MDM.

## 2024-08-18 NOTE — ED PROVIDER NOTE - NSFOLLOWUPINSTRUCTIONS_ED_ALL_ED_FT
You have been seen today for abdominal pain.  Your blood work came back unremarkable.  You have a slightly elevated gallbladder enzyme.  Please follow-up with your primary care within the next week.  Your CT scan shows no acute findings in the abdomen or pelvis.  There is no evidence of gallbladder inflammation or obstruction in the kidneys or bladder.    Please return to the ED if you have any concerning symptoms.  Return to the ED if you have any fevers or chills associated with this right flank pain, or if this pain worsens.    You can use 500-1000mg Tylenol every 6 hours for pain - as needed; maximal daily dose 3000mg.  This is an over-the-counter medications - please respect the warnings on the label. This medication come with certain risks and side effects that you need to discuss with your doctor, especially if you are taking it for a prolonged period.

## 2024-08-18 NOTE — ED ADULT NURSE NOTE - OBJECTIVE STATEMENT
Pt is a 75 y/o F with PMH of Dysphagia, Endometrial CA, HTN, Shingles, HLD, and obesity presenting with RUQ abdominal pain radiating to R flank progressing since Friday night. Pt endorses r flank pain with deep inspiration. Upon assessment pt Is well appearing a/o x 3 speaking coherently in full sentences. Pt is breathing unlabored and equal BL in no apparent distress, abdomen is soft, TTP in RUQ, and skin is warm and dry. Pt denies fevers/chills, headaches/vision changes, chest pain/SOB, n/v/d, or changes in urinary/defecation habits. Pt is in stretcher in lowest position with side rails up.

## 2024-08-18 NOTE — ED PROVIDER NOTE - OBJECTIVE STATEMENT
74-year-old male with past medical history of hypertension, hyperlipidemia, total abdominal hysterectomy presents to the ED with a 2-day history of right flank pain.  States that the flank pain starts in the front that radiates to the back of the right upper quadrant.  Reports having mild swelling around the abdomen as well.  States that she is unable to take a deep breath because it causes her more pain. Reports that it is better with sitting and worse with lying down.  Reports having urinary incontinence for a few years. Denies any fevers, chills, chest pain, shortness of breath, new urinary symptoms. 74-year-old female with past medical history of hypertension, hyperlipidemia, total abdominal hysterectomy presents to the ED with a 2-day history of right flank pain.  States that the flank pain starts in the front that radiates to the back of the right upper quadrant.  Reports having mild swelling around the abdomen as well.  States that she is unable to take a deep breath because it causes her more pain. Reports that it is better with sitting and worse with lying down.  Reports having urinary incontinence for a few years. Denies any fevers, chills, chest pain, shortness of breath, new urinary symptoms.

## 2024-08-18 NOTE — ED ADULT NURSE NOTE - HOW OFTEN DO YOU HAVE A DRINK CONTAINING ALCOHOL?
Received request via: Pharmacy    Was the patient seen in the last year in this department? Yes    Does the patient have an active prescription (recently filled or refills available) for medication(s) requested? No    Does the patient have residential Plus and need 100 day supply (blood pressure, diabetes and cholesterol meds only)? Medication is not for cholesterol, blood pressure or diabetes  
Never

## 2024-08-20 LAB
CULTURE RESULTS: SIGNIFICANT CHANGE UP
SPECIMEN SOURCE: SIGNIFICANT CHANGE UP

## 2024-09-05 ENCOUNTER — APPOINTMENT (OUTPATIENT)
Dept: OBGYN | Facility: CLINIC | Age: 74
End: 2024-09-05

## 2024-09-05 PROCEDURE — 81002 URINALYSIS NONAUTO W/O SCOPE: CPT

## 2024-09-05 PROCEDURE — G0444 DEPRESSION SCREEN ANNUAL: CPT

## 2024-09-05 PROCEDURE — 82270 OCCULT BLOOD FECES: CPT

## 2024-09-19 ENCOUNTER — APPOINTMENT (OUTPATIENT)
Dept: ORTHOPEDIC SURGERY | Facility: CLINIC | Age: 74
End: 2024-09-19

## 2024-10-20 NOTE — ED PROVIDER NOTE - IV ALTEPLASE EXCL ABS HIDDEN
78 year old male with PMHx of HTN, CAD, DM2, BPH, peripheral neuropathy and lumbar spinal stenosis who was sent in from [facility] for bilateral leg swelling admitted for PE, started on lovenox and transitioned to eliquis.  show 78 year old male with PMHx of HTN, CAD, DM2, BPH, peripheral neuropathy and lumbar spinal stenosis who was sent in from [facility] for bilateral leg swelling admitted for PE, started on lovenox and transitioned to eliquis. Course complicated by colonic distention to 13cm from 6.7 cm on 10/14.

## 2024-10-22 ENCOUNTER — INPATIENT (INPATIENT)
Facility: HOSPITAL | Age: 74
LOS: 3 days | Discharge: HOME | DRG: 419 | End: 2024-10-26
Attending: SURGERY | Admitting: SURGERY
Payer: MEDICARE

## 2024-10-22 VITALS
HEIGHT: 63 IN | DIASTOLIC BLOOD PRESSURE: 105 MMHG | WEIGHT: 214.95 LBS | HEART RATE: 79 BPM | RESPIRATION RATE: 20 BRPM | SYSTOLIC BLOOD PRESSURE: 175 MMHG | TEMPERATURE: 98 F | OXYGEN SATURATION: 95 %

## 2024-10-22 DIAGNOSIS — K80.50 CALCULUS OF BILE DUCT WITHOUT CHOLANGITIS OR CHOLECYSTITIS WITHOUT OBSTRUCTION: ICD-10-CM

## 2024-10-22 DIAGNOSIS — Z96.659 PRESENCE OF UNSPECIFIED ARTIFICIAL KNEE JOINT: Chronic | ICD-10-CM

## 2024-10-22 LAB
ALBUMIN SERPL ELPH-MCNC: 4.4 G/DL — SIGNIFICANT CHANGE UP (ref 3.3–5)
ALP SERPL-CCNC: 121 U/L — HIGH (ref 40–120)
ALT FLD-CCNC: 21 U/L — SIGNIFICANT CHANGE UP (ref 10–45)
ANION GAP SERPL CALC-SCNC: 12 MMOL/L — SIGNIFICANT CHANGE UP (ref 5–17)
APPEARANCE UR: CLEAR — SIGNIFICANT CHANGE UP
AST SERPL-CCNC: 19 U/L — SIGNIFICANT CHANGE UP (ref 10–40)
BACTERIA # UR AUTO: NEGATIVE /HPF — SIGNIFICANT CHANGE UP
BASOPHILS # BLD AUTO: 0.03 K/UL — SIGNIFICANT CHANGE UP (ref 0–0.2)
BASOPHILS NFR BLD AUTO: 0.4 % — SIGNIFICANT CHANGE UP (ref 0–2)
BILIRUB SERPL-MCNC: 0.2 MG/DL — SIGNIFICANT CHANGE UP (ref 0.2–1.2)
BILIRUB UR-MCNC: NEGATIVE — SIGNIFICANT CHANGE UP
BUN SERPL-MCNC: 21 MG/DL — SIGNIFICANT CHANGE UP (ref 7–23)
CALCIUM SERPL-MCNC: 9.8 MG/DL — SIGNIFICANT CHANGE UP (ref 8.4–10.5)
CAST: 0 /LPF — SIGNIFICANT CHANGE UP (ref 0–4)
CHLORIDE SERPL-SCNC: 103 MMOL/L — SIGNIFICANT CHANGE UP (ref 96–108)
CO2 SERPL-SCNC: 26 MMOL/L — SIGNIFICANT CHANGE UP (ref 22–31)
COLOR SPEC: YELLOW — SIGNIFICANT CHANGE UP
CREAT SERPL-MCNC: 0.58 MG/DL — SIGNIFICANT CHANGE UP (ref 0.5–1.3)
DIFF PNL FLD: NEGATIVE — SIGNIFICANT CHANGE UP
EGFR: 95 ML/MIN/1.73M2 — SIGNIFICANT CHANGE UP
EGFR: 95 ML/MIN/1.73M2 — SIGNIFICANT CHANGE UP
EOSINOPHIL # BLD AUTO: 0.16 K/UL — SIGNIFICANT CHANGE UP (ref 0–0.5)
EOSINOPHIL NFR BLD AUTO: 2.1 % — SIGNIFICANT CHANGE UP (ref 0–6)
GLUCOSE SERPL-MCNC: 136 MG/DL — HIGH (ref 70–99)
GLUCOSE UR QL: NEGATIVE MG/DL — SIGNIFICANT CHANGE UP
HCT VFR BLD CALC: 42.2 % — SIGNIFICANT CHANGE UP (ref 34.5–45)
HGB BLD-MCNC: 13.5 G/DL — SIGNIFICANT CHANGE UP (ref 11.5–15.5)
IMM GRANULOCYTES NFR BLD AUTO: 0.7 % — SIGNIFICANT CHANGE UP (ref 0–0.9)
KETONES UR-MCNC: NEGATIVE MG/DL — SIGNIFICANT CHANGE UP
LEUKOCYTE ESTERASE UR-ACNC: NEGATIVE — SIGNIFICANT CHANGE UP
LIDOCAIN IGE QN: 23 U/L — SIGNIFICANT CHANGE UP (ref 7–60)
LYMPHOCYTES # BLD AUTO: 1.77 K/UL — SIGNIFICANT CHANGE UP (ref 1–3.3)
LYMPHOCYTES # BLD AUTO: 23.2 % — SIGNIFICANT CHANGE UP (ref 13–44)
MCHC RBC-ENTMCNC: 27.7 PG — SIGNIFICANT CHANGE UP (ref 27–34)
MCHC RBC-ENTMCNC: 32 GM/DL — SIGNIFICANT CHANGE UP (ref 32–36)
MCV RBC AUTO: 86.5 FL — SIGNIFICANT CHANGE UP (ref 80–100)
MONOCYTES # BLD AUTO: 0.73 K/UL — SIGNIFICANT CHANGE UP (ref 0–0.9)
MONOCYTES NFR BLD AUTO: 9.6 % — SIGNIFICANT CHANGE UP (ref 2–14)
NEUTROPHILS # BLD AUTO: 4.89 K/UL — SIGNIFICANT CHANGE UP (ref 1.8–7.4)
NEUTROPHILS NFR BLD AUTO: 64 % — SIGNIFICANT CHANGE UP (ref 43–77)
NITRITE UR-MCNC: NEGATIVE — SIGNIFICANT CHANGE UP
NRBC # BLD: 0 /100 WBCS — SIGNIFICANT CHANGE UP (ref 0–0)
NRBC BLD-RTO: 0 /100 WBCS — SIGNIFICANT CHANGE UP (ref 0–0)
PH UR: 5.5 — SIGNIFICANT CHANGE UP (ref 5–8)
PLATELET # BLD AUTO: 211 K/UL — SIGNIFICANT CHANGE UP (ref 150–400)
POTASSIUM SERPL-MCNC: 3.7 MMOL/L — SIGNIFICANT CHANGE UP (ref 3.5–5.3)
POTASSIUM SERPL-SCNC: 3.7 MMOL/L — SIGNIFICANT CHANGE UP (ref 3.5–5.3)
PROT SERPL-MCNC: 7.3 G/DL — SIGNIFICANT CHANGE UP (ref 6–8.3)
PROT UR-MCNC: NEGATIVE MG/DL — SIGNIFICANT CHANGE UP
RBC # BLD: 4.88 M/UL — SIGNIFICANT CHANGE UP (ref 3.8–5.2)
RBC # FLD: 13.2 % — SIGNIFICANT CHANGE UP (ref 10.3–14.5)
RBC CASTS # UR COMP ASSIST: 0 /HPF — SIGNIFICANT CHANGE UP (ref 0–4)
SODIUM SERPL-SCNC: 141 MMOL/L — SIGNIFICANT CHANGE UP (ref 135–145)
SP GR SPEC: 1.02 — SIGNIFICANT CHANGE UP (ref 1–1.03)
SQUAMOUS # UR AUTO: 1 /HPF — SIGNIFICANT CHANGE UP (ref 0–5)
TROPONIN T, HIGH SENSITIVITY RESULT: 16 NG/L — SIGNIFICANT CHANGE UP (ref 0–51)
UROBILINOGEN FLD QL: 0.2 MG/DL — SIGNIFICANT CHANGE UP (ref 0.2–1)
WBC # BLD: 7.63 K/UL — SIGNIFICANT CHANGE UP (ref 3.8–10.5)
WBC # FLD AUTO: 7.63 K/UL — SIGNIFICANT CHANGE UP (ref 3.8–10.5)
WBC UR QL: 2 /HPF — SIGNIFICANT CHANGE UP (ref 0–5)

## 2024-10-22 PROCEDURE — 71046 X-RAY EXAM CHEST 2 VIEWS: CPT | Mod: 26

## 2024-10-22 PROCEDURE — 76705 ECHO EXAM OF ABDOMEN: CPT | Mod: 26

## 2024-10-22 PROCEDURE — 99285 EMERGENCY DEPT VISIT HI MDM: CPT | Mod: GC

## 2024-10-22 RX ORDER — PIPERACILLIN-TAZO-DEXTROSE,ISO 3.375G/5
3.38 IV SOLUTION, PIGGYBACK PREMIX FROZEN(ML) INTRAVENOUS EVERY 8 HOURS
Refills: 0 | Status: DISCONTINUED | OUTPATIENT
Start: 2024-10-23 | End: 2024-10-25

## 2024-10-22 RX ORDER — SODIUM CHLORIDE 9 G/1000ML
1000 INJECTION, SOLUTION INTRAVENOUS
Refills: 0 | Status: DISCONTINUED | OUTPATIENT
Start: 2024-10-22 | End: 2024-10-25

## 2024-10-22 RX ORDER — DEXTROSE 50 % IN WATER 50 %
15 SYRINGE (ML) INTRAVENOUS ONCE
Refills: 0 | Status: DISCONTINUED | OUTPATIENT
Start: 2024-10-22 | End: 2024-10-25

## 2024-10-22 RX ORDER — PIPERACILLIN-TAZO-DEXTROSE,ISO 3.375G/5
3.38 IV SOLUTION, PIGGYBACK PREMIX FROZEN(ML) INTRAVENOUS ONCE
Refills: 0 | Status: COMPLETED | OUTPATIENT
Start: 2024-10-23 | End: 2024-10-23

## 2024-10-22 RX ORDER — DEXTROSE 50 % IN WATER 50 %
25 SYRINGE (ML) INTRAVENOUS ONCE
Refills: 0 | Status: DISCONTINUED | OUTPATIENT
Start: 2024-10-22 | End: 2024-10-25

## 2024-10-22 RX ORDER — PIPERACILLIN-TAZO-DEXTROSE,ISO 3.375G/5
3.38 IV SOLUTION, PIGGYBACK PREMIX FROZEN(ML) INTRAVENOUS ONCE
Refills: 0 | Status: COMPLETED | OUTPATIENT
Start: 2024-10-22 | End: 2024-10-22

## 2024-10-22 RX ORDER — DEXTROSE 50 % IN WATER 50 %
12.5 SYRINGE (ML) INTRAVENOUS ONCE
Refills: 0 | Status: DISCONTINUED | OUTPATIENT
Start: 2024-10-22 | End: 2024-10-25

## 2024-10-22 RX ORDER — SODIUM CHLORIDE 9 G/1000ML
1000 INJECTION, SOLUTION INTRAVENOUS
Refills: 0 | Status: DISCONTINUED | OUTPATIENT
Start: 2024-10-22 | End: 2024-10-23

## 2024-10-22 RX ORDER — ACETAMINOPHEN 500 MG/5ML
650 LIQUID (ML) ORAL EVERY 6 HOURS
Refills: 0 | Status: DISCONTINUED | OUTPATIENT
Start: 2024-10-22 | End: 2024-10-24

## 2024-10-22 RX ORDER — ENOXAPARIN SODIUM 100 MG/ML
40 INJECTION SUBCUTANEOUS EVERY 24 HOURS
Refills: 0 | Status: DISCONTINUED | OUTPATIENT
Start: 2024-10-22 | End: 2024-10-25

## 2024-10-22 RX ORDER — GLUCAGON 3 MG/1
1 POWDER NASAL ONCE
Refills: 0 | Status: DISCONTINUED | OUTPATIENT
Start: 2024-10-22 | End: 2024-10-25

## 2024-10-22 RX ORDER — INSULIN LISPRO 100 U/ML
INJECTION, SOLUTION INTRAVENOUS; SUBCUTANEOUS EVERY 6 HOURS
Refills: 0 | Status: DISCONTINUED | OUTPATIENT
Start: 2024-10-22 | End: 2024-10-24

## 2024-10-22 RX ADMIN — Medication 200 GRAM(S): at 22:32

## 2024-10-23 ENCOUNTER — TRANSCRIPTION ENCOUNTER (OUTPATIENT)
Age: 74
End: 2024-10-23

## 2024-10-23 LAB
A1C WITH ESTIMATED AVERAGE GLUCOSE RESULT: 6.6 % — HIGH (ref 4–5.6)
ALBUMIN SERPL ELPH-MCNC: 3.8 G/DL — SIGNIFICANT CHANGE UP (ref 3.3–5)
ALBUMIN SERPL ELPH-MCNC: 4.2 G/DL — SIGNIFICANT CHANGE UP (ref 3.3–5)
ALP SERPL-CCNC: 103 U/L — SIGNIFICANT CHANGE UP (ref 40–120)
ALP SERPL-CCNC: 115 U/L — SIGNIFICANT CHANGE UP (ref 40–120)
ALT FLD-CCNC: 17 U/L — SIGNIFICANT CHANGE UP (ref 10–45)
ALT FLD-CCNC: 19 U/L — SIGNIFICANT CHANGE UP (ref 10–45)
ANION GAP SERPL CALC-SCNC: 11 MMOL/L — SIGNIFICANT CHANGE UP (ref 5–17)
ANION GAP SERPL CALC-SCNC: 14 MMOL/L — SIGNIFICANT CHANGE UP (ref 5–17)
APTT BLD: 24.5 SEC — SIGNIFICANT CHANGE UP (ref 24.5–35.6)
APTT BLD: 28.9 SEC — SIGNIFICANT CHANGE UP (ref 24.5–35.6)
AST SERPL-CCNC: 15 U/L — SIGNIFICANT CHANGE UP (ref 10–40)
AST SERPL-CCNC: 18 U/L — SIGNIFICANT CHANGE UP (ref 10–40)
BILIRUB SERPL-MCNC: 0.5 MG/DL — SIGNIFICANT CHANGE UP (ref 0.2–1.2)
BILIRUB SERPL-MCNC: 0.5 MG/DL — SIGNIFICANT CHANGE UP (ref 0.2–1.2)
BUN SERPL-MCNC: 17 MG/DL — SIGNIFICANT CHANGE UP (ref 7–23)
BUN SERPL-MCNC: 18 MG/DL — SIGNIFICANT CHANGE UP (ref 7–23)
CALCIUM SERPL-MCNC: 9.6 MG/DL — SIGNIFICANT CHANGE UP (ref 8.4–10.5)
CALCIUM SERPL-MCNC: 9.9 MG/DL — SIGNIFICANT CHANGE UP (ref 8.4–10.5)
CHLORIDE SERPL-SCNC: 103 MMOL/L — SIGNIFICANT CHANGE UP (ref 96–108)
CHLORIDE SERPL-SCNC: 105 MMOL/L — SIGNIFICANT CHANGE UP (ref 96–108)
CO2 SERPL-SCNC: 24 MMOL/L — SIGNIFICANT CHANGE UP (ref 22–31)
CO2 SERPL-SCNC: 25 MMOL/L — SIGNIFICANT CHANGE UP (ref 22–31)
CREAT SERPL-MCNC: 0.66 MG/DL — SIGNIFICANT CHANGE UP (ref 0.5–1.3)
CREAT SERPL-MCNC: 0.7 MG/DL — SIGNIFICANT CHANGE UP (ref 0.5–1.3)
CULTURE RESULTS: SIGNIFICANT CHANGE UP
EGFR: 91 ML/MIN/1.73M2 — SIGNIFICANT CHANGE UP
EGFR: 91 ML/MIN/1.73M2 — SIGNIFICANT CHANGE UP
EGFR: 93 ML/MIN/1.73M2 — SIGNIFICANT CHANGE UP
EGFR: 93 ML/MIN/1.73M2 — SIGNIFICANT CHANGE UP
ESTIMATED AVERAGE GLUCOSE: 143 MG/DL — HIGH (ref 68–114)
GLUCOSE BLDC GLUCOMTR-MCNC: 114 MG/DL — HIGH (ref 70–99)
GLUCOSE BLDC GLUCOMTR-MCNC: 119 MG/DL — HIGH (ref 70–99)
GLUCOSE BLDC GLUCOMTR-MCNC: 119 MG/DL — HIGH (ref 70–99)
GLUCOSE BLDC GLUCOMTR-MCNC: 121 MG/DL — HIGH (ref 70–99)
GLUCOSE BLDC GLUCOMTR-MCNC: 123 MG/DL — HIGH (ref 70–99)
GLUCOSE BLDC GLUCOMTR-MCNC: 143 MG/DL — HIGH (ref 70–99)
GLUCOSE SERPL-MCNC: 128 MG/DL — HIGH (ref 70–99)
GLUCOSE SERPL-MCNC: 136 MG/DL — HIGH (ref 70–99)
HCT VFR BLD CALC: 39.8 % — SIGNIFICANT CHANGE UP (ref 34.5–45)
HGB BLD-MCNC: 12.5 G/DL — SIGNIFICANT CHANGE UP (ref 11.5–15.5)
INR BLD: 1 RATIO — SIGNIFICANT CHANGE UP (ref 0.85–1.16)
INR BLD: 1.04 RATIO — SIGNIFICANT CHANGE UP (ref 0.85–1.16)
MAGNESIUM SERPL-MCNC: 2.1 MG/DL — SIGNIFICANT CHANGE UP (ref 1.6–2.6)
MCHC RBC-ENTMCNC: 27.2 PG — SIGNIFICANT CHANGE UP (ref 27–34)
MCHC RBC-ENTMCNC: 31.4 GM/DL — LOW (ref 32–36)
MCV RBC AUTO: 86.7 FL — SIGNIFICANT CHANGE UP (ref 80–100)
NRBC # BLD: 0 /100 WBCS — SIGNIFICANT CHANGE UP (ref 0–0)
NRBC BLD-RTO: 0 /100 WBCS — SIGNIFICANT CHANGE UP (ref 0–0)
PHOSPHATE SERPL-MCNC: 3.6 MG/DL — SIGNIFICANT CHANGE UP (ref 2.5–4.5)
PLATELET # BLD AUTO: 190 K/UL — SIGNIFICANT CHANGE UP (ref 150–400)
POTASSIUM SERPL-MCNC: 4 MMOL/L — SIGNIFICANT CHANGE UP (ref 3.5–5.3)
POTASSIUM SERPL-MCNC: 4.5 MMOL/L — SIGNIFICANT CHANGE UP (ref 3.5–5.3)
POTASSIUM SERPL-SCNC: 4 MMOL/L — SIGNIFICANT CHANGE UP (ref 3.5–5.3)
POTASSIUM SERPL-SCNC: 4.5 MMOL/L — SIGNIFICANT CHANGE UP (ref 3.5–5.3)
PROT SERPL-MCNC: 6.5 G/DL — SIGNIFICANT CHANGE UP (ref 6–8.3)
PROT SERPL-MCNC: 7.3 G/DL — SIGNIFICANT CHANGE UP (ref 6–8.3)
PROTHROM AB SERPL-ACNC: 11.4 SEC — SIGNIFICANT CHANGE UP (ref 9.9–13.4)
PROTHROM AB SERPL-ACNC: 11.9 SEC — SIGNIFICANT CHANGE UP (ref 9.9–13.4)
RBC # BLD: 4.59 M/UL — SIGNIFICANT CHANGE UP (ref 3.8–5.2)
RBC # FLD: 13.5 % — SIGNIFICANT CHANGE UP (ref 10.3–14.5)
SODIUM SERPL-SCNC: 141 MMOL/L — SIGNIFICANT CHANGE UP (ref 135–145)
SODIUM SERPL-SCNC: 141 MMOL/L — SIGNIFICANT CHANGE UP (ref 135–145)
SPECIMEN SOURCE: SIGNIFICANT CHANGE UP
WBC # BLD: 6.87 K/UL — SIGNIFICANT CHANGE UP (ref 3.8–10.5)
WBC # FLD AUTO: 6.87 K/UL — SIGNIFICANT CHANGE UP (ref 3.8–10.5)

## 2024-10-23 PROCEDURE — 99223 1ST HOSP IP/OBS HIGH 75: CPT | Mod: 57

## 2024-10-23 RX ORDER — HYDROMORPHONE/SOD CHLOR,ISO/PF 2 MG/10 ML
0.5 SYRINGE (ML) INJECTION
Refills: 0 | Status: DISCONTINUED | OUTPATIENT
Start: 2024-10-23 | End: 2024-10-23

## 2024-10-23 RX ORDER — SODIUM CHLORIDE 9 G/1000ML
1000 INJECTION, SOLUTION INTRAVENOUS
Refills: 0 | Status: DISCONTINUED | OUTPATIENT
Start: 2024-10-23 | End: 2024-10-24

## 2024-10-23 RX ORDER — ONDANSETRON HCL/PF 4 MG/2 ML
4 VIAL (ML) INJECTION ONCE
Refills: 0 | Status: DISCONTINUED | OUTPATIENT
Start: 2024-10-23 | End: 2024-10-23

## 2024-10-23 RX ORDER — INFLUENZA A VIRUS A/IDAHO/07/2018 (H1N1) ANTIGEN (MDCK CELL DERIVED, PROPIOLACTONE INACTIVATED, INFLUENZA A VIRUS A/INDIANA/08/2018 (H3N2) ANTIGEN (MDCK CELL DERIVED, PROPIOLACTONE INACTIVATED), INFLUENZA B VIRUS B/SINGAPORE/INFTT-16-0610/2016 ANTIGEN (MDCK CELL DERIVED, PROPIOLACTONE INACTIVATED), INFLUENZA B VIRUS B/IOWA/06/2017 ANTIGEN (MDCK CELL DERIVED, PROPIOLACTONE INACTIVATED) 15; 15; 15; 15 UG/.5ML; UG/.5ML; UG/.5ML; UG/.5ML
0.5 INJECTION, SUSPENSION INTRAMUSCULAR ONCE
Refills: 0 | Status: COMPLETED | OUTPATIENT
Start: 2024-10-23 | End: 2024-10-23

## 2024-10-23 RX ORDER — LOSARTAN POTASSIUM 100 MG/1
100 TABLET, FILM COATED ORAL DAILY
Refills: 0 | Status: DISCONTINUED | OUTPATIENT
Start: 2024-10-23 | End: 2024-10-25

## 2024-10-23 RX ORDER — FENTANYL CITRATE-0.9 % NACL/PF 100MCG/2ML
50 SYRINGE (ML) INTRAVENOUS
Refills: 0 | Status: DISCONTINUED | OUTPATIENT
Start: 2024-10-23 | End: 2024-10-23

## 2024-10-23 RX ADMIN — Medication 25 GRAM(S): at 02:48

## 2024-10-23 RX ADMIN — Medication 25 GRAM(S): at 21:49

## 2024-10-23 RX ADMIN — Medication 650 MILLIGRAM(S): at 03:32

## 2024-10-23 RX ADMIN — Medication 25 GRAM(S): at 10:59

## 2024-10-23 RX ADMIN — SODIUM CHLORIDE 110 MILLILITER(S): 9 INJECTION, SOLUTION INTRAVENOUS at 15:16

## 2024-10-23 RX ADMIN — Medication 25 GRAM(S): at 14:59

## 2024-10-23 RX ADMIN — SODIUM CHLORIDE 110 MILLILITER(S): 9 INJECTION, SOLUTION INTRAVENOUS at 01:02

## 2024-10-23 RX ADMIN — LOSARTAN POTASSIUM 100 MILLIGRAM(S): 100 TABLET, FILM COATED ORAL at 15:08

## 2024-10-23 RX ADMIN — Medication 650 MILLIGRAM(S): at 01:01

## 2024-10-24 ENCOUNTER — TRANSCRIPTION ENCOUNTER (OUTPATIENT)
Age: 74
End: 2024-10-24

## 2024-10-24 LAB
ANION GAP SERPL CALC-SCNC: 10 MMOL/L — SIGNIFICANT CHANGE UP (ref 5–17)
BLD GP AB SCN SERPL QL: NEGATIVE — SIGNIFICANT CHANGE UP
BUN SERPL-MCNC: 18 MG/DL — SIGNIFICANT CHANGE UP (ref 7–23)
CALCIUM SERPL-MCNC: 10.1 MG/DL — SIGNIFICANT CHANGE UP (ref 8.4–10.5)
CHLORIDE SERPL-SCNC: 103 MMOL/L — SIGNIFICANT CHANGE UP (ref 96–108)
CO2 SERPL-SCNC: 29 MMOL/L — SIGNIFICANT CHANGE UP (ref 22–31)
CREAT SERPL-MCNC: 0.82 MG/DL — SIGNIFICANT CHANGE UP (ref 0.5–1.3)
EGFR: 75 ML/MIN/1.73M2 — SIGNIFICANT CHANGE UP
EGFR: 75 ML/MIN/1.73M2 — SIGNIFICANT CHANGE UP
GLUCOSE BLDC GLUCOMTR-MCNC: 110 MG/DL — HIGH (ref 70–99)
GLUCOSE BLDC GLUCOMTR-MCNC: 121 MG/DL — HIGH (ref 70–99)
GLUCOSE BLDC GLUCOMTR-MCNC: 129 MG/DL — HIGH (ref 70–99)
GLUCOSE BLDC GLUCOMTR-MCNC: 145 MG/DL — HIGH (ref 70–99)
GLUCOSE SERPL-MCNC: 132 MG/DL — HIGH (ref 70–99)
HCT VFR BLD CALC: 43 % — SIGNIFICANT CHANGE UP (ref 34.5–45)
HGB BLD-MCNC: 13.4 G/DL — SIGNIFICANT CHANGE UP (ref 11.5–15.5)
MAGNESIUM SERPL-MCNC: 2.3 MG/DL — SIGNIFICANT CHANGE UP (ref 1.6–2.6)
MCHC RBC-ENTMCNC: 27.3 PG — SIGNIFICANT CHANGE UP (ref 27–34)
MCHC RBC-ENTMCNC: 31.2 GM/DL — LOW (ref 32–36)
MCV RBC AUTO: 87.8 FL — SIGNIFICANT CHANGE UP (ref 80–100)
NRBC # BLD: 0 /100 WBCS — SIGNIFICANT CHANGE UP (ref 0–0)
NRBC BLD-RTO: 0 /100 WBCS — SIGNIFICANT CHANGE UP (ref 0–0)
PHOSPHATE SERPL-MCNC: 4.6 MG/DL — HIGH (ref 2.5–4.5)
PLATELET # BLD AUTO: 208 K/UL — SIGNIFICANT CHANGE UP (ref 150–400)
POTASSIUM SERPL-MCNC: 4.6 MMOL/L — SIGNIFICANT CHANGE UP (ref 3.5–5.3)
POTASSIUM SERPL-SCNC: 4.6 MMOL/L — SIGNIFICANT CHANGE UP (ref 3.5–5.3)
RBC # BLD: 4.9 M/UL — SIGNIFICANT CHANGE UP (ref 3.8–5.2)
RBC # FLD: 13.4 % — SIGNIFICANT CHANGE UP (ref 10.3–14.5)
RH IG SCN BLD-IMP: POSITIVE — SIGNIFICANT CHANGE UP
SODIUM SERPL-SCNC: 142 MMOL/L — SIGNIFICANT CHANGE UP (ref 135–145)
WBC # BLD: 7.9 K/UL — SIGNIFICANT CHANGE UP (ref 3.8–10.5)
WBC # FLD AUTO: 7.9 K/UL — SIGNIFICANT CHANGE UP (ref 3.8–10.5)

## 2024-10-24 PROCEDURE — 99232 SBSQ HOSP IP/OBS MODERATE 35: CPT

## 2024-10-24 RX ORDER — ACETAMINOPHEN 500 MG/5ML
1000 LIQUID (ML) ORAL EVERY 6 HOURS
Refills: 0 | Status: COMPLETED | OUTPATIENT
Start: 2024-10-24 | End: 2024-10-24

## 2024-10-24 RX ORDER — SODIUM CHLORIDE 9 G/1000ML
1000 INJECTION, SOLUTION INTRAVENOUS
Refills: 0 | Status: DISCONTINUED | OUTPATIENT
Start: 2024-10-24 | End: 2024-10-25

## 2024-10-24 RX ORDER — ACETAMINOPHEN 500 MG/5ML
2 LIQUID (ML) ORAL
Qty: 56 | Refills: 0
Start: 2024-10-24 | End: 2024-10-30

## 2024-10-24 RX ORDER — INSULIN LISPRO 100 U/ML
INJECTION, SOLUTION INTRAVENOUS; SUBCUTANEOUS
Refills: 0 | Status: DISCONTINUED | OUTPATIENT
Start: 2024-10-24 | End: 2024-10-25

## 2024-10-24 RX ORDER — SODIUM CHLORIDE 9 G/1000ML
1000 INJECTION, SOLUTION INTRAVENOUS
Refills: 0 | Status: DISCONTINUED | OUTPATIENT
Start: 2024-10-24 | End: 2024-10-24

## 2024-10-24 RX ADMIN — Medication 400 MILLIGRAM(S): at 17:00

## 2024-10-24 RX ADMIN — Medication 25 GRAM(S): at 21:19

## 2024-10-24 RX ADMIN — Medication 1000 MILLIGRAM(S): at 17:30

## 2024-10-24 RX ADMIN — Medication 400 MILLIGRAM(S): at 23:01

## 2024-10-24 RX ADMIN — Medication 400 MILLIGRAM(S): at 06:17

## 2024-10-24 RX ADMIN — LOSARTAN POTASSIUM 100 MILLIGRAM(S): 100 TABLET, FILM COATED ORAL at 05:11

## 2024-10-24 RX ADMIN — SODIUM CHLORIDE 110 MILLILITER(S): 9 INJECTION, SOLUTION INTRAVENOUS at 11:33

## 2024-10-24 RX ADMIN — Medication 25 GRAM(S): at 13:18

## 2024-10-24 RX ADMIN — Medication 25 GRAM(S): at 05:11

## 2024-10-24 RX ADMIN — ENOXAPARIN SODIUM 40 MILLIGRAM(S): 100 INJECTION SUBCUTANEOUS at 11:34

## 2024-10-24 RX ADMIN — Medication 1000 MILLIGRAM(S): at 12:04

## 2024-10-24 RX ADMIN — Medication 1000 MILLIGRAM(S): at 23:31

## 2024-10-24 RX ADMIN — Medication 400 MILLIGRAM(S): at 11:34

## 2024-10-25 ENCOUNTER — TRANSCRIPTION ENCOUNTER (OUTPATIENT)
Age: 74
End: 2024-10-25

## 2024-10-25 LAB
ANION GAP SERPL CALC-SCNC: 13 MMOL/L — SIGNIFICANT CHANGE UP (ref 5–17)
APTT BLD: 30 SEC — SIGNIFICANT CHANGE UP (ref 24.5–35.6)
BUN SERPL-MCNC: 23 MG/DL — SIGNIFICANT CHANGE UP (ref 7–23)
CALCIUM SERPL-MCNC: 10 MG/DL — SIGNIFICANT CHANGE UP (ref 8.4–10.5)
CHLORIDE SERPL-SCNC: 102 MMOL/L — SIGNIFICANT CHANGE UP (ref 96–108)
CO2 SERPL-SCNC: 27 MMOL/L — SIGNIFICANT CHANGE UP (ref 22–31)
CREAT SERPL-MCNC: 0.85 MG/DL — SIGNIFICANT CHANGE UP (ref 0.5–1.3)
EGFR: 72 ML/MIN/1.73M2 — SIGNIFICANT CHANGE UP
EGFR: 72 ML/MIN/1.73M2 — SIGNIFICANT CHANGE UP
GLUCOSE BLDC GLUCOMTR-MCNC: 117 MG/DL — HIGH (ref 70–99)
GLUCOSE BLDC GLUCOMTR-MCNC: 129 MG/DL — HIGH (ref 70–99)
GLUCOSE BLDC GLUCOMTR-MCNC: 180 MG/DL — HIGH (ref 70–99)
GLUCOSE BLDC GLUCOMTR-MCNC: 183 MG/DL — HIGH (ref 70–99)
GLUCOSE BLDC GLUCOMTR-MCNC: 266 MG/DL — HIGH (ref 70–99)
GLUCOSE SERPL-MCNC: 134 MG/DL — HIGH (ref 70–99)
HCT VFR BLD CALC: 40.6 % — SIGNIFICANT CHANGE UP (ref 34.5–45)
HGB BLD-MCNC: 12.7 G/DL — SIGNIFICANT CHANGE UP (ref 11.5–15.5)
INR BLD: 1 RATIO — SIGNIFICANT CHANGE UP (ref 0.85–1.16)
MAGNESIUM SERPL-MCNC: 2.3 MG/DL — SIGNIFICANT CHANGE UP (ref 1.6–2.6)
MCHC RBC-ENTMCNC: 27.6 PG — SIGNIFICANT CHANGE UP (ref 27–34)
MCHC RBC-ENTMCNC: 31.3 GM/DL — LOW (ref 32–36)
MCV RBC AUTO: 88.3 FL — SIGNIFICANT CHANGE UP (ref 80–100)
NRBC # BLD: 0 /100 WBCS — SIGNIFICANT CHANGE UP (ref 0–0)
NRBC BLD-RTO: 0 /100 WBCS — SIGNIFICANT CHANGE UP (ref 0–0)
PHOSPHATE SERPL-MCNC: 4.7 MG/DL — HIGH (ref 2.5–4.5)
PLATELET # BLD AUTO: 190 K/UL — SIGNIFICANT CHANGE UP (ref 150–400)
POTASSIUM SERPL-MCNC: 5.1 MMOL/L — SIGNIFICANT CHANGE UP (ref 3.5–5.3)
POTASSIUM SERPL-SCNC: 5.1 MMOL/L — SIGNIFICANT CHANGE UP (ref 3.5–5.3)
PROTHROM AB SERPL-ACNC: 11.5 SEC — SIGNIFICANT CHANGE UP (ref 9.9–13.4)
RBC # BLD: 4.6 M/UL — SIGNIFICANT CHANGE UP (ref 3.8–5.2)
RBC # FLD: 13.4 % — SIGNIFICANT CHANGE UP (ref 10.3–14.5)
SODIUM SERPL-SCNC: 142 MMOL/L — SIGNIFICANT CHANGE UP (ref 135–145)
WBC # BLD: 5.95 K/UL — SIGNIFICANT CHANGE UP (ref 3.8–10.5)
WBC # FLD AUTO: 5.95 K/UL — SIGNIFICANT CHANGE UP (ref 3.8–10.5)

## 2024-10-25 PROCEDURE — 47562 LAPAROSCOPIC CHOLECYSTECTOMY: CPT | Mod: GC

## 2024-10-25 PROCEDURE — 99233 SBSQ HOSP IP/OBS HIGH 50: CPT | Mod: FS,57

## 2024-10-25 PROCEDURE — 88304 TISSUE EXAM BY PATHOLOGIST: CPT | Mod: 26

## 2024-10-25 DEVICE — LIGATING CLIPS WECK HEMOLOK POLYMER LARGE (PURPLE) 6: Type: IMPLANTABLE DEVICE | Site: ABDOMINAL | Status: FUNCTIONAL

## 2024-10-25 DEVICE — LIGATING CLIPS WECK HEMOLOK POLYMER MEDIUM-LARGE (GREEN) 6: Type: IMPLANTABLE DEVICE | Site: ABDOMINAL | Status: FUNCTIONAL

## 2024-10-25 RX ORDER — INSULIN LISPRO 100 U/ML
INJECTION, SOLUTION INTRAVENOUS; SUBCUTANEOUS AT BEDTIME
Refills: 0 | Status: DISCONTINUED | OUTPATIENT
Start: 2024-10-25 | End: 2024-10-26

## 2024-10-25 RX ORDER — LOSARTAN POTASSIUM 100 MG/1
100 TABLET, FILM COATED ORAL DAILY
Refills: 0 | Status: DISCONTINUED | OUTPATIENT
Start: 2024-10-26 | End: 2024-10-26

## 2024-10-25 RX ORDER — OXYCODONE HYDROCHLORIDE 30 MG/1
5 TABLET ORAL EVERY 4 HOURS
Refills: 0 | Status: DISCONTINUED | OUTPATIENT
Start: 2024-10-25 | End: 2024-10-26

## 2024-10-25 RX ORDER — ONDANSETRON HCL/PF 4 MG/2 ML
4 VIAL (ML) INJECTION ONCE
Refills: 0 | Status: COMPLETED | OUTPATIENT
Start: 2024-10-25 | End: 2024-10-25

## 2024-10-25 RX ORDER — SIMETHICONE 80 MG
80 TABLET,CHEWABLE ORAL ONCE
Refills: 0 | Status: COMPLETED | OUTPATIENT
Start: 2024-10-25 | End: 2024-10-25

## 2024-10-25 RX ORDER — HYDROMORPHONE/SOD CHLOR,ISO/PF 2 MG/10 ML
0.5 SYRINGE (ML) INJECTION
Refills: 0 | Status: DISCONTINUED | OUTPATIENT
Start: 2024-10-25 | End: 2024-10-25

## 2024-10-25 RX ORDER — DIPHENHYDRAMINE HCL 12.5MG/5ML
25 ELIXIR ORAL ONCE
Refills: 0 | Status: COMPLETED | OUTPATIENT
Start: 2024-10-25 | End: 2024-10-25

## 2024-10-25 RX ORDER — ACETAMINOPHEN 500 MG/5ML
1000 LIQUID (ML) ORAL EVERY 6 HOURS
Refills: 0 | Status: DISCONTINUED | OUTPATIENT
Start: 2024-10-25 | End: 2024-10-26

## 2024-10-25 RX ORDER — INSULIN LISPRO 100 U/ML
INJECTION, SOLUTION INTRAVENOUS; SUBCUTANEOUS EVERY 6 HOURS
Refills: 0 | Status: DISCONTINUED | OUTPATIENT
Start: 2024-10-25 | End: 2024-10-25

## 2024-10-25 RX ORDER — OXYCODONE HYDROCHLORIDE 30 MG/1
2.5 TABLET ORAL EVERY 4 HOURS
Refills: 0 | Status: DISCONTINUED | OUTPATIENT
Start: 2024-10-25 | End: 2024-10-26

## 2024-10-25 RX ORDER — DEXTROSE 50 % IN WATER 50 %
25 SYRINGE (ML) INTRAVENOUS ONCE
Refills: 0 | Status: DISCONTINUED | OUTPATIENT
Start: 2024-10-25 | End: 2024-10-26

## 2024-10-25 RX ORDER — ENOXAPARIN SODIUM 100 MG/ML
40 INJECTION SUBCUTANEOUS EVERY 24 HOURS
Refills: 0 | Status: DISCONTINUED | OUTPATIENT
Start: 2024-10-26 | End: 2024-10-26

## 2024-10-25 RX ORDER — SODIUM CHLORIDE 9 G/1000ML
1000 INJECTION, SOLUTION INTRAVENOUS
Refills: 0 | Status: DISCONTINUED | OUTPATIENT
Start: 2024-10-25 | End: 2024-10-26

## 2024-10-25 RX ORDER — DEXTROSE 50 % IN WATER 50 %
12.5 SYRINGE (ML) INTRAVENOUS ONCE
Refills: 0 | Status: DISCONTINUED | OUTPATIENT
Start: 2024-10-25 | End: 2024-10-26

## 2024-10-25 RX ORDER — FENTANYL CITRATE-0.9 % NACL/PF 100MCG/2ML
25 SYRINGE (ML) INTRAVENOUS
Refills: 0 | Status: DISCONTINUED | OUTPATIENT
Start: 2024-10-25 | End: 2024-10-25

## 2024-10-25 RX ORDER — INSULIN LISPRO 100 U/ML
INJECTION, SOLUTION INTRAVENOUS; SUBCUTANEOUS
Refills: 0 | Status: DISCONTINUED | OUTPATIENT
Start: 2024-10-25 | End: 2024-10-26

## 2024-10-25 RX ORDER — IBUPROFEN 200 MG
600 TABLET ORAL EVERY 6 HOURS
Refills: 0 | Status: DISCONTINUED | OUTPATIENT
Start: 2024-10-25 | End: 2024-10-26

## 2024-10-25 RX ORDER — DEXTROSE 50 % IN WATER 50 %
15 SYRINGE (ML) INTRAVENOUS ONCE
Refills: 0 | Status: DISCONTINUED | OUTPATIENT
Start: 2024-10-25 | End: 2024-10-26

## 2024-10-25 RX ORDER — GLUCAGON 3 MG/1
1 POWDER NASAL ONCE
Refills: 0 | Status: DISCONTINUED | OUTPATIENT
Start: 2024-10-25 | End: 2024-10-26

## 2024-10-25 RX ADMIN — INSULIN LISPRO 1: 100 INJECTION, SOLUTION INTRAVENOUS; SUBCUTANEOUS at 16:01

## 2024-10-25 RX ADMIN — Medication 0.5 MILLIGRAM(S): at 15:05

## 2024-10-25 RX ADMIN — Medication 0.5 MILLIGRAM(S): at 14:50

## 2024-10-25 RX ADMIN — INSULIN LISPRO 1: 100 INJECTION, SOLUTION INTRAVENOUS; SUBCUTANEOUS at 21:30

## 2024-10-25 RX ADMIN — Medication 80 MILLIGRAM(S): at 15:37

## 2024-10-25 RX ADMIN — Medication 1000 MILLIGRAM(S): at 20:00

## 2024-10-25 RX ADMIN — Medication 0.5 MILLIGRAM(S): at 14:30

## 2024-10-25 RX ADMIN — LOSARTAN POTASSIUM 100 MILLIGRAM(S): 100 TABLET, FILM COATED ORAL at 05:03

## 2024-10-25 RX ADMIN — Medication 25 GRAM(S): at 05:04

## 2024-10-25 RX ADMIN — Medication 4 MILLIGRAM(S): at 16:02

## 2024-10-25 RX ADMIN — Medication 20 MILLIGRAM(S): at 15:37

## 2024-10-25 RX ADMIN — Medication 1000 MILLIGRAM(S): at 19:30

## 2024-10-25 RX ADMIN — Medication 4 MILLIGRAM(S): at 19:30

## 2024-10-25 RX ADMIN — Medication 0.5 MILLIGRAM(S): at 14:15

## 2024-10-25 RX ADMIN — Medication 25 MILLIGRAM(S): at 19:30

## 2024-10-25 RX ADMIN — Medication 20 MILLIGRAM(S): at 19:30

## 2024-10-25 RX ADMIN — Medication 0.5 MILLIGRAM(S): at 14:37

## 2024-10-26 ENCOUNTER — TRANSCRIPTION ENCOUNTER (OUTPATIENT)
Age: 74
End: 2024-10-26

## 2024-10-26 VITALS
HEART RATE: 60 BPM | SYSTOLIC BLOOD PRESSURE: 110 MMHG | TEMPERATURE: 98 F | DIASTOLIC BLOOD PRESSURE: 64 MMHG | RESPIRATION RATE: 18 BRPM | OXYGEN SATURATION: 95 %

## 2024-10-26 DIAGNOSIS — K80.00 CALCULUS OF GALLBLADDER WITH ACUTE CHOLECYSTITIS W/OUT OBSTRUCTION: ICD-10-CM

## 2024-10-26 LAB
ANION GAP SERPL CALC-SCNC: 12 MMOL/L — SIGNIFICANT CHANGE UP (ref 5–17)
BUN SERPL-MCNC: 17 MG/DL — SIGNIFICANT CHANGE UP (ref 7–23)
CALCIUM SERPL-MCNC: 10 MG/DL — SIGNIFICANT CHANGE UP (ref 8.4–10.5)
CHLORIDE SERPL-SCNC: 99 MMOL/L — SIGNIFICANT CHANGE UP (ref 96–108)
CO2 SERPL-SCNC: 27 MMOL/L — SIGNIFICANT CHANGE UP (ref 22–31)
CREAT SERPL-MCNC: 0.78 MG/DL — SIGNIFICANT CHANGE UP (ref 0.5–1.3)
EGFR: 80 ML/MIN/1.73M2 — SIGNIFICANT CHANGE UP
EGFR: 80 ML/MIN/1.73M2 — SIGNIFICANT CHANGE UP
GLUCOSE BLDC GLUCOMTR-MCNC: 121 MG/DL — HIGH (ref 70–99)
GLUCOSE BLDC GLUCOMTR-MCNC: 159 MG/DL — HIGH (ref 70–99)
GLUCOSE BLDC GLUCOMTR-MCNC: 212 MG/DL — HIGH (ref 70–99)
GLUCOSE SERPL-MCNC: 130 MG/DL — HIGH (ref 70–99)
HCT VFR BLD CALC: 40.5 % — SIGNIFICANT CHANGE UP (ref 34.5–45)
HGB BLD-MCNC: 12.8 G/DL — SIGNIFICANT CHANGE UP (ref 11.5–15.5)
MAGNESIUM SERPL-MCNC: 2.2 MG/DL — SIGNIFICANT CHANGE UP (ref 1.6–2.6)
MCHC RBC-ENTMCNC: 27.2 PG — SIGNIFICANT CHANGE UP (ref 27–34)
MCHC RBC-ENTMCNC: 31.6 GM/DL — LOW (ref 32–36)
MCV RBC AUTO: 86.2 FL — SIGNIFICANT CHANGE UP (ref 80–100)
NRBC # BLD: 0 /100 WBCS — SIGNIFICANT CHANGE UP (ref 0–0)
NRBC BLD-RTO: 0 /100 WBCS — SIGNIFICANT CHANGE UP (ref 0–0)
PHOSPHATE SERPL-MCNC: 3.2 MG/DL — SIGNIFICANT CHANGE UP (ref 2.5–4.5)
PLATELET # BLD AUTO: 225 K/UL — SIGNIFICANT CHANGE UP (ref 150–400)
POTASSIUM SERPL-MCNC: 3.9 MMOL/L — SIGNIFICANT CHANGE UP (ref 3.5–5.3)
POTASSIUM SERPL-SCNC: 3.9 MMOL/L — SIGNIFICANT CHANGE UP (ref 3.5–5.3)
RBC # BLD: 4.7 M/UL — SIGNIFICANT CHANGE UP (ref 3.8–5.2)
RBC # FLD: 13.2 % — SIGNIFICANT CHANGE UP (ref 10.3–14.5)
SODIUM SERPL-SCNC: 138 MMOL/L — SIGNIFICANT CHANGE UP (ref 135–145)
WBC # BLD: 11.36 K/UL — HIGH (ref 3.8–10.5)
WBC # FLD AUTO: 11.36 K/UL — HIGH (ref 3.8–10.5)

## 2024-10-26 RX ORDER — OXYCODONE HYDROCHLORIDE 30 MG/1
1 TABLET ORAL
Qty: 5 | Refills: 0
Start: 2024-10-26

## 2024-10-26 RX ADMIN — Medication 1000 MILLIGRAM(S): at 08:51

## 2024-10-26 RX ADMIN — INSULIN LISPRO 2: 100 INJECTION, SOLUTION INTRAVENOUS; SUBCUTANEOUS at 11:14

## 2024-10-26 RX ADMIN — Medication 1000 MILLIGRAM(S): at 01:31

## 2024-10-26 RX ADMIN — Medication 1000 MILLIGRAM(S): at 08:21

## 2024-10-26 RX ADMIN — Medication 1000 MILLIGRAM(S): at 13:52

## 2024-10-26 RX ADMIN — Medication 1000 MILLIGRAM(S): at 14:22

## 2024-10-26 RX ADMIN — INSULIN LISPRO 1: 100 INJECTION, SOLUTION INTRAVENOUS; SUBCUTANEOUS at 17:38

## 2024-10-26 RX ADMIN — LOSARTAN POTASSIUM 100 MILLIGRAM(S): 100 TABLET, FILM COATED ORAL at 05:06

## 2024-10-26 RX ADMIN — Medication 1000 MILLIGRAM(S): at 01:01

## 2024-10-28 ENCOUNTER — TRANSCRIPTION ENCOUNTER (OUTPATIENT)
Age: 74
End: 2024-10-28

## 2024-11-04 ENCOUNTER — APPOINTMENT (OUTPATIENT)
Dept: TRAUMA SURGERY | Facility: CLINIC | Age: 74
End: 2024-11-04
Payer: MEDICARE

## 2024-11-04 DIAGNOSIS — K80.00 CALCULUS OF GALLBLADDER WITH ACUTE CHOLECYSTITIS W/OUT OBSTRUCTION: ICD-10-CM

## 2024-11-04 PROCEDURE — 99024 POSTOP FOLLOW-UP VISIT: CPT

## 2024-11-26 PROCEDURE — 88304 TISSUE EXAM BY PATHOLOGIST: CPT

## 2024-11-26 PROCEDURE — 87086 URINE CULTURE/COLONY COUNT: CPT

## 2024-11-26 PROCEDURE — 85610 PROTHROMBIN TIME: CPT

## 2024-11-26 PROCEDURE — 76705 ECHO EXAM OF ABDOMEN: CPT

## 2024-11-26 PROCEDURE — 83735 ASSAY OF MAGNESIUM: CPT

## 2024-11-26 PROCEDURE — 82962 GLUCOSE BLOOD TEST: CPT

## 2024-11-26 PROCEDURE — 85027 COMPLETE CBC AUTOMATED: CPT

## 2024-11-26 PROCEDURE — 99285 EMERGENCY DEPT VISIT HI MDM: CPT

## 2024-11-26 PROCEDURE — 86900 BLOOD TYPING SEROLOGIC ABO: CPT

## 2024-11-26 PROCEDURE — 81001 URINALYSIS AUTO W/SCOPE: CPT

## 2024-11-26 PROCEDURE — 86901 BLOOD TYPING SEROLOGIC RH(D): CPT

## 2024-11-26 PROCEDURE — 80048 BASIC METABOLIC PNL TOTAL CA: CPT

## 2024-11-26 PROCEDURE — 80053 COMPREHEN METABOLIC PANEL: CPT

## 2024-11-26 PROCEDURE — 84100 ASSAY OF PHOSPHORUS: CPT

## 2024-11-26 PROCEDURE — 36415 COLL VENOUS BLD VENIPUNCTURE: CPT

## 2024-11-26 PROCEDURE — 83036 HEMOGLOBIN GLYCOSYLATED A1C: CPT

## 2024-11-26 PROCEDURE — C9399: CPT

## 2024-11-26 PROCEDURE — 71046 X-RAY EXAM CHEST 2 VIEWS: CPT

## 2024-11-26 PROCEDURE — C1889: CPT

## 2024-11-26 PROCEDURE — 96374 THER/PROPH/DIAG INJ IV PUSH: CPT

## 2024-11-26 PROCEDURE — 86850 RBC ANTIBODY SCREEN: CPT

## 2024-11-26 PROCEDURE — 83690 ASSAY OF LIPASE: CPT

## 2024-11-26 PROCEDURE — 85730 THROMBOPLASTIN TIME PARTIAL: CPT

## 2024-11-26 PROCEDURE — 85025 COMPLETE CBC W/AUTO DIFF WBC: CPT

## 2024-11-26 PROCEDURE — 84484 ASSAY OF TROPONIN QUANT: CPT

## 2024-12-18 NOTE — ED PROVIDER NOTE - BREATH SOUNDS
The pharmacy is requesting a refill of the below medication which has been pended for you:     Requested Prescriptions     Pending Prescriptions Disp Refills    tamsulosin (FLOMAX) 0.4 MG capsule [Pharmacy Med Name: TAMSULOSIN 0.4MG CAPSULES] 90 capsule 1     Sig: TAKE 1 CAPSULE BY MOUTH DAILY       Last Appointment Date: 12/12/2024  Next Appointment Date: 6/12/2025    No Known Allergies   DIMINISHED/RALES/normal

## 2025-01-29 NOTE — PATIENT PROFILE ADULT. - PRO INTERPRETER NEED 2
Increase the pure protein drink with 30 grams of protein to twicw a day.    Get the   TDap , which is tetanus shot at pharmacy.  
English

## 2025-06-02 ENCOUNTER — OUTPATIENT (OUTPATIENT)
Dept: OUTPATIENT SERVICES | Facility: HOSPITAL | Age: 75
LOS: 1 days | End: 2025-06-02
Payer: MEDICARE

## 2025-06-02 ENCOUNTER — APPOINTMENT (OUTPATIENT)
Dept: MAMMOGRAPHY | Facility: IMAGING CENTER | Age: 75
End: 2025-06-02
Payer: MEDICARE

## 2025-06-02 DIAGNOSIS — Z96.659 PRESENCE OF UNSPECIFIED ARTIFICIAL KNEE JOINT: Chronic | ICD-10-CM

## 2025-06-02 DIAGNOSIS — Z00.8 ENCOUNTER FOR OTHER GENERAL EXAMINATION: ICD-10-CM

## 2025-06-02 PROCEDURE — 77063 BREAST TOMOSYNTHESIS BI: CPT | Mod: 26

## 2025-06-02 PROCEDURE — 77067 SCR MAMMO BI INCL CAD: CPT

## 2025-06-02 PROCEDURE — 77067 SCR MAMMO BI INCL CAD: CPT | Mod: 26

## 2025-06-02 PROCEDURE — 77063 BREAST TOMOSYNTHESIS BI: CPT

## 2025-09-08 ENCOUNTER — APPOINTMENT (OUTPATIENT)
Dept: OBGYN | Facility: CLINIC | Age: 75
End: 2025-09-08

## (undated) DEVICE — SUT PDS II 0 18" ENDOLOOP LIGATURE

## (undated) DEVICE — INSUFFLATION NDL COVIDIEN STEP 14G FOR STEP/VERSASTEP

## (undated) DEVICE — TUBING TRUWAVE PRESSURE MALE/FEMALE 72"

## (undated) DEVICE — TROCAR APPLIED MEDICAL KII BALLOON BLUNT TIP 12MM X 100MM

## (undated) DEVICE — POLY TRAP ETRAP

## (undated) DEVICE — TUBING IV SET GRAVITY 3Y 100" MACRO

## (undated) DEVICE — IRRIGATOR BIO SHIELD

## (undated) DEVICE — TROCAR COVIDIEN VERSAONE BLADED FIXATION 11MM STANDARD

## (undated) DEVICE — ELCTR GROUNDING PAD ADULT COVIDIEN

## (undated) DEVICE — D HELP - CLEARVIEW CLEARIFY SYSTEM

## (undated) DEVICE — SPECIMEN CONTAINER 100ML

## (undated) DEVICE — SYR LUER LOK 20CC

## (undated) DEVICE — SUT SURGIPRO 2-0 30" GS-22

## (undated) DEVICE — SUCTION YANKAUER NO CONTROL VENT

## (undated) DEVICE — DRSG TELFA 3 X 8

## (undated) DEVICE — WARMING BLANKET UPPER ADULT

## (undated) DEVICE — TUBING SUCTION CONN 6FT STERILE

## (undated) DEVICE — TUBING STRYKEFLOW II SUCTION / IRRIGATOR

## (undated) DEVICE — STOPCOCK 3 WAY W TUBE 35"

## (undated) DEVICE — SYR LUER LOK 50CC

## (undated) DEVICE — Device

## (undated) DEVICE — SHEARS COVIDIEN ENDO SHEAR 5MM X 31CM W UNIPOLAR CAUTERY

## (undated) DEVICE — CATH IV SAFE BC 22G X 1" (BLUE)

## (undated) DEVICE — PACK ADVANCED LAPAROSCOPIC NS

## (undated) DEVICE — SOL IRR POUR H2O 250ML

## (undated) DEVICE — TUBING STRYKER PNEUMOSURE HI FLOW RTP

## (undated) DEVICE — DRSG STERISTRIPS 0.5 X 4"

## (undated) DEVICE — DRAPE TOWEL BLUE 17" X 24"

## (undated) DEVICE — FOLEY HOLDER STATLOCK 2 WAY ADULT

## (undated) DEVICE — PACK IV START WITH CHG

## (undated) DEVICE — DISSECTOR ENDO PEANUT 5MM

## (undated) DEVICE — CATH IV SAFE INSYTE 14G X 1.75" (ORANGE)

## (undated) DEVICE — VENODYNE/SCD SLEEVE CALF MEDIUM

## (undated) DEVICE — TROCAR COVIDIEN BLUNT TIP HASSAN 10MM

## (undated) DEVICE — TROCAR COVIDIEN VERSAPORT BLADELESS OPTICAL 5MM STANDARD

## (undated) DEVICE — BRUSH COLONOSCOPY CYTOLOGY

## (undated) DEVICE — DRSG OPSITE 2.5 X 2"

## (undated) DEVICE — TUBING INSUFFLATION LAP FILTER 10FT

## (undated) DEVICE — ELCTR BOVIE PENCIL SMOKE EVACUATION

## (undated) DEVICE — BIOPSY FORCEP RADIAL JAW 4 STANDARD WITH NEEDLE

## (undated) DEVICE — CATH IV SAFE BC 20G X 1.16" (PINK)

## (undated) DEVICE — DRAPE C ARM UNIVERSAL

## (undated) DEVICE — FORCEP RADIAL JAW 4 JUMBO 2.8MM 3.2MM 240CM ORANGE DISP

## (undated) DEVICE — CLAMP BX HOT RAD JAW 3

## (undated) DEVICE — LIGASURE BLUNT TIP 5MM X 37CM

## (undated) DEVICE — SYR LUER LOK 30CC

## (undated) DEVICE — SNARE EXACTO COLD 9MMX230CM

## (undated) DEVICE — SENSOR O2 FINGER ADULT 24/CA

## (undated) DEVICE — TROCAR COVIDIEN VERSASTEP PLUS 12MM STANDARD

## (undated) DEVICE — SUT BIOSYN 4-0 18" P-12

## (undated) DEVICE — TUBING SUCTION 20FT

## (undated) DEVICE — ENDOCATCH 10MM

## (undated) DEVICE — GOWN TRIMAX LG

## (undated) DEVICE — SUT POLYSORB 2-0 30" V-20 UNDYED

## (undated) DEVICE — ELCTR LAPAROSCOPIC MONOPOLAR CORD

## (undated) DEVICE — SUT POLYSORB 0 36" GU-46

## (undated) DEVICE — MEDICATION LABELS W MARKER

## (undated) DEVICE — SOL IRR POUR NS 0.9% 500ML

## (undated) DEVICE — TROCAR COVIDIEN VERSASTEP 5MM STANDARD

## (undated) DEVICE — SUT POLYSORB 3-0 30" V-20 UNDYED

## (undated) DEVICE — SHEARS COVIDIEN ROTICULATOR ENDO MINI 5MM WITH UNIPOLAR CAUTERY

## (undated) DEVICE — PREP CHLORAPREP HI-LITE ORANGE 26ML

## (undated) DEVICE — TUBING IRRIGATION DAVOL SYSTEM X STREAM

## (undated) DEVICE — TROCAR COVIDIEN VERSAONE FIXATION CANNULA 5MM

## (undated) DEVICE — POSITIONER FOAM EGG CRATE ULNAR 2PCS (PINK)

## (undated) DEVICE — SOL INJ NS 0.9% 500ML 2 PORT

## (undated) DEVICE — BLADE SCALPEL SAFETYLOCK #10